# Patient Record
Sex: FEMALE | Race: WHITE | NOT HISPANIC OR LATINO | Employment: OTHER
[De-identification: names, ages, dates, MRNs, and addresses within clinical notes are randomized per-mention and may not be internally consistent; named-entity substitution may affect disease eponyms.]

---

## 2021-01-01 ENCOUNTER — HEALTH MAINTENANCE LETTER (OUTPATIENT)
Age: 66
End: 2021-01-01

## 2021-01-01 ENCOUNTER — HOSPITAL ENCOUNTER (OUTPATIENT)
Dept: MRI IMAGING | Facility: CLINIC | Age: 66
DRG: 025 | End: 2021-08-11
Attending: NEUROLOGICAL SURGERY | Admitting: NEUROLOGICAL SURGERY
Payer: MEDICARE

## 2021-01-01 ENCOUNTER — PATIENT OUTREACH (OUTPATIENT)
Dept: ONCOLOGY | Facility: CLINIC | Age: 66
End: 2021-01-01

## 2021-01-01 ENCOUNTER — ANESTHESIA (OUTPATIENT)
Dept: SURGERY | Facility: CLINIC | Age: 66
DRG: 025 | End: 2021-01-01
Payer: MEDICARE

## 2021-01-01 ENCOUNTER — PRE VISIT (OUTPATIENT)
Dept: SURGERY | Facility: CLINIC | Age: 66
End: 2021-01-01

## 2021-01-01 ENCOUNTER — APPOINTMENT (OUTPATIENT)
Dept: OCCUPATIONAL THERAPY | Facility: CLINIC | Age: 66
DRG: 025 | End: 2021-01-01
Attending: STUDENT IN AN ORGANIZED HEALTH CARE EDUCATION/TRAINING PROGRAM
Payer: MEDICARE

## 2021-01-01 ENCOUNTER — LAB (OUTPATIENT)
Dept: LAB | Facility: CLINIC | Age: 66
End: 2021-01-01
Payer: MEDICARE

## 2021-01-01 ENCOUNTER — APPOINTMENT (OUTPATIENT)
Dept: CT IMAGING | Facility: CLINIC | Age: 66
DRG: 025 | End: 2021-01-01
Attending: STUDENT IN AN ORGANIZED HEALTH CARE EDUCATION/TRAINING PROGRAM
Payer: MEDICARE

## 2021-01-01 ENCOUNTER — ANESTHESIA EVENT (OUTPATIENT)
Dept: SURGERY | Facility: CLINIC | Age: 66
DRG: 025 | End: 2021-01-01
Payer: MEDICARE

## 2021-01-01 ENCOUNTER — VIRTUAL VISIT (OUTPATIENT)
Dept: NEUROSURGERY | Facility: CLINIC | Age: 66
End: 2021-01-01
Attending: NEUROLOGICAL SURGERY
Payer: MEDICARE

## 2021-01-01 ENCOUNTER — PREP FOR PROCEDURE (OUTPATIENT)
Dept: NEUROSURGERY | Facility: CLINIC | Age: 66
End: 2021-01-01

## 2021-01-01 ENCOUNTER — HOSPITAL ENCOUNTER (INPATIENT)
Facility: CLINIC | Age: 66
LOS: 1 days | Discharge: HOME | DRG: 025 | End: 2021-08-12
Attending: NEUROLOGICAL SURGERY | Admitting: NEUROLOGICAL SURGERY
Payer: MEDICARE

## 2021-01-01 ENCOUNTER — DOCUMENTATION ONLY (OUTPATIENT)
Dept: GERIATRICS | Facility: CLINIC | Age: 66
End: 2021-01-01

## 2021-01-01 ENCOUNTER — VIRTUAL VISIT (OUTPATIENT)
Dept: SURGERY | Facility: CLINIC | Age: 66
End: 2021-01-01
Payer: MEDICARE

## 2021-01-01 VITALS
HEART RATE: 102 BPM | HEIGHT: 67 IN | RESPIRATION RATE: 16 BRPM | OXYGEN SATURATION: 96 % | BODY MASS INDEX: 33.22 KG/M2 | DIASTOLIC BLOOD PRESSURE: 68 MMHG | SYSTOLIC BLOOD PRESSURE: 112 MMHG | WEIGHT: 211.64 LBS | TEMPERATURE: 97.6 F

## 2021-01-01 DIAGNOSIS — C79.31 BRAIN METASTASIS: Primary | ICD-10-CM

## 2021-01-01 DIAGNOSIS — C79.31 BRAIN METASTASIS: ICD-10-CM

## 2021-01-01 DIAGNOSIS — Z01.818 PREOP EXAMINATION: Primary | ICD-10-CM

## 2021-01-01 DIAGNOSIS — Z11.59 ENCOUNTER FOR SCREENING FOR OTHER VIRAL DISEASES: ICD-10-CM

## 2021-01-01 DIAGNOSIS — Z01.818 PREOP EXAMINATION: ICD-10-CM

## 2021-01-01 LAB
ABO/RH(D): NORMAL
ABO/RH(D): NORMAL
ALBUMIN SERPL-MCNC: 3.4 G/DL (ref 3.4–5)
ALP SERPL-CCNC: 129 U/L (ref 40–150)
ALT SERPL W P-5'-P-CCNC: 24 U/L (ref 0–50)
ANION GAP SERPL CALCULATED.3IONS-SCNC: 5 MMOL/L (ref 3–14)
ANION GAP SERPL CALCULATED.3IONS-SCNC: 6 MMOL/L (ref 3–14)
ANION GAP SERPL CALCULATED.3IONS-SCNC: 8 MMOL/L (ref 3–14)
ANTIBODY SCREEN: NEGATIVE
ANTIBODY SCREEN: NEGATIVE
APTT PPP: 32 SECONDS (ref 22–38)
AST SERPL W P-5'-P-CCNC: 13 U/L (ref 0–45)
BILIRUB SERPL-MCNC: 0.4 MG/DL (ref 0.2–1.3)
BUN SERPL-MCNC: 11 MG/DL (ref 7–30)
BUN SERPL-MCNC: 8 MG/DL (ref 7–30)
BUN SERPL-MCNC: 9 MG/DL (ref 7–30)
CALCIUM SERPL-MCNC: 8.5 MG/DL (ref 8.5–10.1)
CALCIUM SERPL-MCNC: 8.9 MG/DL (ref 8.5–10.1)
CALCIUM SERPL-MCNC: 9 MG/DL (ref 8.5–10.1)
CHLORIDE BLD-SCNC: 110 MMOL/L (ref 94–109)
CHLORIDE BLD-SCNC: 111 MMOL/L (ref 94–109)
CHLORIDE BLD-SCNC: 114 MMOL/L (ref 94–109)
CO2 SERPL-SCNC: 20 MMOL/L (ref 20–32)
CO2 SERPL-SCNC: 23 MMOL/L (ref 20–32)
CO2 SERPL-SCNC: 25 MMOL/L (ref 20–32)
CREAT SERPL-MCNC: 0.61 MG/DL (ref 0.52–1.04)
CREAT SERPL-MCNC: 0.73 MG/DL (ref 0.52–1.04)
CREAT SERPL-MCNC: 0.79 MG/DL (ref 0.52–1.04)
ERYTHROCYTE [DISTWIDTH] IN BLOOD BY AUTOMATED COUNT: 13.2 % (ref 10–15)
ERYTHROCYTE [DISTWIDTH] IN BLOOD BY AUTOMATED COUNT: 13.4 % (ref 10–15)
ERYTHROCYTE [DISTWIDTH] IN BLOOD BY AUTOMATED COUNT: 13.6 % (ref 10–15)
ERYTHROCYTE [DISTWIDTH] IN BLOOD BY AUTOMATED COUNT: 13.7 % (ref 10–15)
GFR SERPL CREATININE-BSD FRML MDRD: 78 ML/MIN/1.73M2
GFR SERPL CREATININE-BSD FRML MDRD: 86 ML/MIN/1.73M2
GFR SERPL CREATININE-BSD FRML MDRD: >90 ML/MIN/1.73M2
GLUCOSE BLD-MCNC: 102 MG/DL (ref 70–99)
GLUCOSE BLD-MCNC: 115 MG/DL (ref 70–99)
GLUCOSE BLD-MCNC: 166 MG/DL (ref 70–99)
GLUCOSE BLDC GLUCOMTR-MCNC: 125 MG/DL (ref 70–99)
HCT VFR BLD AUTO: 32 % (ref 35–47)
HCT VFR BLD AUTO: 32.1 % (ref 35–47)
HCT VFR BLD AUTO: 36.7 % (ref 35–47)
HCT VFR BLD AUTO: 37.7 % (ref 35–47)
HGB BLD-MCNC: 10.5 G/DL (ref 11.7–15.7)
HGB BLD-MCNC: 10.7 G/DL (ref 11.7–15.7)
HGB BLD-MCNC: 11.9 G/DL (ref 11.7–15.7)
HGB BLD-MCNC: 12.4 G/DL (ref 11.7–15.7)
INR PPP: 0.93 (ref 0.85–1.15)
MAGNESIUM SERPL-MCNC: 2 MG/DL (ref 1.6–2.3)
MAGNESIUM SERPL-MCNC: 2.1 MG/DL (ref 1.6–2.3)
MCH RBC QN AUTO: 33.5 PG (ref 26.5–33)
MCH RBC QN AUTO: 33.7 PG (ref 26.5–33)
MCH RBC QN AUTO: 34.1 PG (ref 26.5–33)
MCH RBC QN AUTO: 34.2 PG (ref 26.5–33)
MCHC RBC AUTO-ENTMCNC: 32.4 G/DL (ref 31.5–36.5)
MCHC RBC AUTO-ENTMCNC: 32.7 G/DL (ref 31.5–36.5)
MCHC RBC AUTO-ENTMCNC: 32.9 G/DL (ref 31.5–36.5)
MCHC RBC AUTO-ENTMCNC: 33.4 G/DL (ref 31.5–36.5)
MCV RBC AUTO: 102 FL (ref 78–100)
MCV RBC AUTO: 103 FL (ref 78–100)
MCV RBC AUTO: 103 FL (ref 78–100)
MCV RBC AUTO: 104 FL (ref 78–100)
PATH REPORT.COMMENTS IMP SPEC: NORMAL
PATH REPORT.FINAL DX SPEC: NORMAL
PATH REPORT.GROSS SPEC: NORMAL
PATH REPORT.INTRAOP OBS SPEC DOC: NORMAL
PATH REPORT.MICROSCOPIC SPEC OTHER STN: NORMAL
PATH REPORT.RELEVANT HX SPEC: NORMAL
PHOSPHATE SERPL-MCNC: 2.3 MG/DL (ref 2.5–4.5)
PHOSPHATE SERPL-MCNC: 3.7 MG/DL (ref 2.5–4.5)
PHOTO IMAGE: NORMAL
PLATELET # BLD AUTO: 179 10E3/UL (ref 150–450)
PLATELET # BLD AUTO: 186 10E3/UL (ref 150–450)
PLATELET # BLD AUTO: 199 10E3/UL (ref 150–450)
PLATELET # BLD AUTO: 222 10E3/UL (ref 150–450)
POTASSIUM BLD-SCNC: 3.4 MMOL/L (ref 3.4–5.3)
POTASSIUM BLD-SCNC: 3.7 MMOL/L (ref 3.4–5.3)
POTASSIUM BLD-SCNC: 3.8 MMOL/L (ref 3.4–5.3)
POTASSIUM BLD-SCNC: 4.2 MMOL/L (ref 3.4–5.3)
PROT SERPL-MCNC: 7.2 G/DL (ref 6.8–8.8)
RBC # BLD AUTO: 3.12 10E6/UL (ref 3.8–5.2)
RBC # BLD AUTO: 3.14 10E6/UL (ref 3.8–5.2)
RBC # BLD AUTO: 3.55 10E6/UL (ref 3.8–5.2)
RBC # BLD AUTO: 3.63 10E6/UL (ref 3.8–5.2)
SARS-COV-2 RNA RESP QL NAA+PROBE: NEGATIVE
SODIUM SERPL-SCNC: 139 MMOL/L (ref 133–144)
SODIUM SERPL-SCNC: 140 MMOL/L (ref 133–144)
SODIUM SERPL-SCNC: 143 MMOL/L (ref 133–144)
SPECIMEN EXPIRATION DATE: NORMAL
SPECIMEN EXPIRATION DATE: NORMAL
WBC # BLD AUTO: 2.5 10E3/UL (ref 4–11)
WBC # BLD AUTO: 3.3 10E3/UL (ref 4–11)
WBC # BLD AUTO: 4.1 10E3/UL (ref 4–11)
WBC # BLD AUTO: 4.6 10E3/UL (ref 4–11)

## 2021-01-01 PROCEDURE — 250N000024 HC ISOFLURANE, PER MIN: Performed by: NEUROLOGICAL SURGERY

## 2021-01-01 PROCEDURE — 258N000003 HC RX IP 258 OP 636: Performed by: ANESTHESIOLOGY

## 2021-01-01 PROCEDURE — 272N000002 HC OR SUPPLY OTHER OPNP: Performed by: NEUROLOGICAL SURGERY

## 2021-01-01 PROCEDURE — 250N000009 HC RX 250: Performed by: NEUROLOGICAL SURGERY

## 2021-01-01 PROCEDURE — 85027 COMPLETE CBC AUTOMATED: CPT | Performed by: STUDENT IN AN ORGANIZED HEALTH CARE EDUCATION/TRAINING PROGRAM

## 2021-01-01 PROCEDURE — 85027 COMPLETE CBC AUTOMATED: CPT

## 2021-01-01 PROCEDURE — 36415 COLL VENOUS BLD VENIPUNCTURE: CPT

## 2021-01-01 PROCEDURE — 250N000011 HC RX IP 250 OP 636: Performed by: NURSE ANESTHETIST, CERTIFIED REGISTERED

## 2021-01-01 PROCEDURE — 86901 BLOOD TYPING SEROLOGIC RH(D): CPT

## 2021-01-01 PROCEDURE — 250N000011 HC RX IP 250 OP 636: Performed by: NEUROLOGICAL SURGERY

## 2021-01-01 PROCEDURE — 250N000009 HC RX 250: Performed by: NURSE ANESTHETIST, CERTIFIED REGISTERED

## 2021-01-01 PROCEDURE — 250N000011 HC RX IP 250 OP 636: Performed by: STUDENT IN AN ORGANIZED HEALTH CARE EDUCATION/TRAINING PROGRAM

## 2021-01-01 PROCEDURE — 250N000013 HC RX MED GY IP 250 OP 250 PS 637: Performed by: NURSE ANESTHETIST, CERTIFIED REGISTERED

## 2021-01-01 PROCEDURE — 272N000001 HC OR GENERAL SUPPLY STERILE: Performed by: NEUROLOGICAL SURGERY

## 2021-01-01 PROCEDURE — 80048 BASIC METABOLIC PNL TOTAL CA: CPT

## 2021-01-01 PROCEDURE — 85610 PROTHROMBIN TIME: CPT | Performed by: STUDENT IN AN ORGANIZED HEALTH CARE EDUCATION/TRAINING PROGRAM

## 2021-01-01 PROCEDURE — 250N000013 HC RX MED GY IP 250 OP 250 PS 637: Performed by: STUDENT IN AN ORGANIZED HEALTH CARE EDUCATION/TRAINING PROGRAM

## 2021-01-01 PROCEDURE — 88331 PATH CONSLTJ SURG 1 BLK 1SPC: CPT | Mod: TC | Performed by: NEUROLOGICAL SURGERY

## 2021-01-01 PROCEDURE — 86900 BLOOD TYPING SEROLOGIC ABO: CPT

## 2021-01-01 PROCEDURE — 84100 ASSAY OF PHOSPHORUS: CPT | Performed by: STUDENT IN AN ORGANIZED HEALTH CARE EDUCATION/TRAINING PROGRAM

## 2021-01-01 PROCEDURE — 85027 COMPLETE CBC AUTOMATED: CPT | Performed by: NEUROLOGICAL SURGERY

## 2021-01-01 PROCEDURE — 999N000141 HC STATISTIC PRE-PROCEDURE NURSING ASSESSMENT: Performed by: NEUROLOGICAL SURGERY

## 2021-01-01 PROCEDURE — 99204 OFFICE O/P NEW MOD 45 MIN: CPT | Mod: 95 | Performed by: PHYSICIAN ASSISTANT

## 2021-01-01 PROCEDURE — 710N000010 HC RECOVERY PHASE 1, LEVEL 2, PER MIN: Performed by: NEUROLOGICAL SURGERY

## 2021-01-01 PROCEDURE — 64999 UNLISTED PX NERVOUS SYSTEM: CPT | Performed by: NEUROLOGICAL SURGERY

## 2021-01-01 PROCEDURE — 70450 CT HEAD/BRAIN W/O DYE: CPT | Mod: 77

## 2021-01-01 PROCEDURE — 70552 MRI BRAIN STEM W/DYE: CPT | Mod: 26 | Performed by: RADIOLOGY

## 2021-01-01 PROCEDURE — 84100 ASSAY OF PHOSPHORUS: CPT | Performed by: NEUROLOGICAL SURGERY

## 2021-01-01 PROCEDURE — 370N000017 HC ANESTHESIA TECHNICAL FEE, PER MIN: Performed by: NEUROLOGICAL SURGERY

## 2021-01-01 PROCEDURE — 82374 ASSAY BLOOD CARBON DIOXIDE: CPT | Performed by: STUDENT IN AN ORGANIZED HEALTH CARE EDUCATION/TRAINING PROGRAM

## 2021-01-01 PROCEDURE — 258N000003 HC RX IP 258 OP 636: Performed by: NURSE ANESTHETIST, CERTIFIED REGISTERED

## 2021-01-01 PROCEDURE — 70450 CT HEAD/BRAIN W/O DYE: CPT | Mod: 26 | Performed by: RADIOLOGY

## 2021-01-01 PROCEDURE — A9585 GADOBUTROL INJECTION: HCPCS | Performed by: NEUROLOGICAL SURGERY

## 2021-01-01 PROCEDURE — 250N000012 HC RX MED GY IP 250 OP 636 PS 637: Performed by: STUDENT IN AN ORGANIZED HEALTH CARE EDUCATION/TRAINING PROGRAM

## 2021-01-01 PROCEDURE — 86900 BLOOD TYPING SEROLOGIC ABO: CPT | Performed by: STUDENT IN AN ORGANIZED HEALTH CARE EDUCATION/TRAINING PROGRAM

## 2021-01-01 PROCEDURE — 999N000157 HC STATISTIC RCP TIME EA 10 MIN

## 2021-01-01 PROCEDURE — U0003 INFECTIOUS AGENT DETECTION BY NUCLEIC ACID (DNA OR RNA); SEVERE ACUTE RESPIRATORY SYNDROME CORONAVIRUS 2 (SARS-COV-2) (CORONAVIRUS DISEASE [COVID-19]), AMPLIFIED PROBE TECHNIQUE, MAKING USE OF HIGH THROUGHPUT TECHNOLOGIES AS DESCRIBED BY CMS-2020-01-R: HCPCS

## 2021-01-01 PROCEDURE — 97165 OT EVAL LOW COMPLEX 30 MIN: CPT | Mod: GO

## 2021-01-01 PROCEDURE — 36415 COLL VENOUS BLD VENIPUNCTURE: CPT | Performed by: STUDENT IN AN ORGANIZED HEALTH CARE EDUCATION/TRAINING PROGRAM

## 2021-01-01 PROCEDURE — 200N000002 HC R&B ICU UMMC

## 2021-01-01 PROCEDURE — 86850 RBC ANTIBODY SCREEN: CPT

## 2021-01-01 PROCEDURE — 999N000015 HC STATISTIC ARTERIAL MONITORING DAILY

## 2021-01-01 PROCEDURE — 999N000147 HC STATISTIC PT IP EVAL DEFER

## 2021-01-01 PROCEDURE — 360N000080 HC SURGERY LEVEL 7, PER MIN: Performed by: NEUROLOGICAL SURGERY

## 2021-01-01 PROCEDURE — U0005 INFEC AGEN DETEC AMPLI PROBE: HCPCS

## 2021-01-01 PROCEDURE — 80053 COMPREHEN METABOLIC PANEL: CPT | Performed by: STUDENT IN AN ORGANIZED HEALTH CARE EDUCATION/TRAINING PROGRAM

## 2021-01-01 PROCEDURE — 88331 PATH CONSLTJ SURG 1 BLK 1SPC: CPT | Mod: 26 | Performed by: SPECIALIST

## 2021-01-01 PROCEDURE — 97535 SELF CARE MNGMENT TRAINING: CPT | Mod: GO

## 2021-01-01 PROCEDURE — 70450 CT HEAD/BRAIN W/O DYE: CPT

## 2021-01-01 PROCEDURE — 00573ZZ DESTRUCTION OF CEREBRAL HEMISPHERE, PERCUTANEOUS APPROACH: ICD-10-PCS | Performed by: NEUROLOGICAL SURGERY

## 2021-01-01 PROCEDURE — 88307 TISSUE EXAM BY PATHOLOGIST: CPT | Mod: 26 | Performed by: SPECIALIST

## 2021-01-01 PROCEDURE — 83735 ASSAY OF MAGNESIUM: CPT | Performed by: STUDENT IN AN ORGANIZED HEALTH CARE EDUCATION/TRAINING PROGRAM

## 2021-01-01 PROCEDURE — 99204 OFFICE O/P NEW MOD 45 MIN: CPT | Mod: 95 | Performed by: NEUROLOGICAL SURGERY

## 2021-01-01 PROCEDURE — 258N000003 HC RX IP 258 OP 636: Performed by: STUDENT IN AN ORGANIZED HEALTH CARE EDUCATION/TRAINING PROGRAM

## 2021-01-01 PROCEDURE — 84132 ASSAY OF SERUM POTASSIUM: CPT | Performed by: STUDENT IN AN ORGANIZED HEALTH CARE EDUCATION/TRAINING PROGRAM

## 2021-01-01 PROCEDURE — 258N000003 HC RX IP 258 OP 636: Performed by: NEUROLOGICAL SURGERY

## 2021-01-01 PROCEDURE — 250N000009 HC RX 250: Performed by: ANESTHESIOLOGY

## 2021-01-01 PROCEDURE — 255N000002 HC RX 255 OP 636: Performed by: NEUROLOGICAL SURGERY

## 2021-01-01 PROCEDURE — 85730 THROMBOPLASTIN TIME PARTIAL: CPT | Performed by: STUDENT IN AN ORGANIZED HEALTH CARE EDUCATION/TRAINING PROGRAM

## 2021-01-01 PROCEDURE — 83735 ASSAY OF MAGNESIUM: CPT | Performed by: NEUROLOGICAL SURGERY

## 2021-01-01 PROCEDURE — G1004 CDSM NDSC: HCPCS | Performed by: RADIOLOGY

## 2021-01-01 PROCEDURE — G1004 CDSM NDSC: HCPCS

## 2021-01-01 PROCEDURE — 00B73ZX EXCISION OF CEREBRAL HEMISPHERE, PERCUTANEOUS APPROACH, DIAGNOSTIC: ICD-10-PCS | Performed by: NEUROLOGICAL SURGERY

## 2021-01-01 RX ORDER — LEVETIRACETAM 100 MG/ML
SOLUTION ORAL PRN
Status: DISCONTINUED | OUTPATIENT
Start: 2021-01-01 | End: 2021-01-01

## 2021-01-01 RX ORDER — OXYCODONE HYDROCHLORIDE 5 MG/1
5 TABLET ORAL EVERY 4 HOURS PRN
Status: DISCONTINUED | OUTPATIENT
Start: 2021-01-01 | End: 2021-01-01 | Stop reason: ALTCHOICE

## 2021-01-01 RX ORDER — TOPIRAMATE 25 MG/1
50 TABLET, FILM COATED ORAL 2 TIMES DAILY
Status: DISCONTINUED | OUTPATIENT
Start: 2021-01-01 | End: 2021-01-01 | Stop reason: HOSPADM

## 2021-01-01 RX ORDER — LABETALOL HYDROCHLORIDE 5 MG/ML
10 INJECTION, SOLUTION INTRAVENOUS
Status: DISCONTINUED | OUTPATIENT
Start: 2021-01-01 | End: 2021-01-01 | Stop reason: HOSPADM

## 2021-01-01 RX ORDER — PROCHLORPERAZINE MALEATE 5 MG
5 TABLET ORAL EVERY 6 HOURS PRN
Status: DISCONTINUED | OUTPATIENT
Start: 2021-01-01 | End: 2021-01-01 | Stop reason: HOSPADM

## 2021-01-01 RX ORDER — LIDOCAINE 40 MG/G
CREAM TOPICAL
Status: DISCONTINUED | OUTPATIENT
Start: 2021-01-01 | End: 2021-01-01 | Stop reason: HOSPADM

## 2021-01-01 RX ORDER — AMOXICILLIN 250 MG
1 CAPSULE ORAL 2 TIMES DAILY
Status: DISCONTINUED | OUTPATIENT
Start: 2021-01-01 | End: 2021-01-01 | Stop reason: HOSPADM

## 2021-01-01 RX ORDER — TRAZODONE HYDROCHLORIDE 50 MG/1
125 TABLET, FILM COATED ORAL AT BEDTIME
COMMUNITY
Start: 2020-07-08 | End: 2022-01-01

## 2021-01-01 RX ORDER — ONDANSETRON 2 MG/ML
4 INJECTION INTRAMUSCULAR; INTRAVENOUS EVERY 6 HOURS PRN
Status: DISCONTINUED | OUTPATIENT
Start: 2021-01-01 | End: 2021-01-01 | Stop reason: HOSPADM

## 2021-01-01 RX ORDER — PROCHLORPERAZINE MALEATE 10 MG
10 TABLET ORAL EVERY 6 HOURS PRN
COMMUNITY
Start: 2021-02-01

## 2021-01-01 RX ORDER — HEPARIN SODIUM,PORCINE 10 UNIT/ML
5-10 VIAL (ML) INTRAVENOUS
Status: DISCONTINUED | OUTPATIENT
Start: 2021-01-01 | End: 2021-01-01 | Stop reason: HOSPADM

## 2021-01-01 RX ORDER — DEXAMETHASONE 1 MG
1 TABLET ORAL EVERY 8 HOURS
Qty: 9 TABLET | Refills: 0 | Status: SHIPPED | OUTPATIENT
Start: 2021-01-01 | End: 2021-01-01

## 2021-01-01 RX ORDER — LEVETIRACETAM 750 MG/1
750 TABLET ORAL 2 TIMES DAILY
Status: DISCONTINUED | OUTPATIENT
Start: 2021-01-01 | End: 2021-01-01 | Stop reason: HOSPADM

## 2021-01-01 RX ORDER — TOPIRAMATE 50 MG/1
TABLET, FILM COATED ORAL 2 TIMES DAILY
COMMUNITY
Start: 2020-09-04

## 2021-01-01 RX ORDER — NALOXONE HYDROCHLORIDE 0.4 MG/ML
0.4 INJECTION, SOLUTION INTRAMUSCULAR; INTRAVENOUS; SUBCUTANEOUS
Status: DISCONTINUED | OUTPATIENT
Start: 2021-01-01 | End: 2021-01-01 | Stop reason: HOSPADM

## 2021-01-01 RX ORDER — DEXAMETHASONE 4 MG/1
4 TABLET ORAL EVERY 8 HOURS
Qty: 12 TABLET | Refills: 0 | Status: SHIPPED | OUTPATIENT
Start: 2021-01-01 | End: 2021-01-01

## 2021-01-01 RX ORDER — POLYETHYLENE GLYCOL 3350 17 G/17G
17 POWDER, FOR SOLUTION ORAL DAILY
Status: DISCONTINUED | OUTPATIENT
Start: 2021-01-01 | End: 2021-01-01 | Stop reason: HOSPADM

## 2021-01-01 RX ORDER — ACETAMINOPHEN 325 MG/1
650 TABLET ORAL EVERY 4 HOURS PRN
Status: DISCONTINUED | OUTPATIENT
Start: 2021-01-01 | End: 2021-01-01 | Stop reason: HOSPADM

## 2021-01-01 RX ORDER — DEXAMETHASONE 4 MG/1
4 TABLET ORAL EVERY 8 HOURS SCHEDULED
Status: DISCONTINUED | OUTPATIENT
Start: 2021-01-01 | End: 2021-01-01 | Stop reason: HOSPADM

## 2021-01-01 RX ORDER — BIOTIN 10 MG
TABLET ORAL EVERY MORNING
COMMUNITY

## 2021-01-01 RX ORDER — NYSTATIN 100000 [USP'U]/G
POWDER TOPICAL DAILY PRN
COMMUNITY

## 2021-01-01 RX ORDER — LIDOCAINE 40 MG/G
CREAM TOPICAL
Status: COMPLETED | OUTPATIENT
Start: 2021-01-01 | End: 2021-01-01

## 2021-01-01 RX ORDER — BENZONATATE 200 MG/1
CAPSULE ORAL 3 TIMES DAILY
COMMUNITY
Start: 2021-03-10

## 2021-01-01 RX ORDER — OXYCODONE HYDROCHLORIDE 10 MG/1
10 TABLET ORAL EVERY 4 HOURS PRN
Status: DISCONTINUED | OUTPATIENT
Start: 2021-01-01 | End: 2021-01-01 | Stop reason: HOSPADM

## 2021-01-01 RX ORDER — LANOLIN ALCOHOL/MO/W.PET/CERES
1 CREAM (GRAM) TOPICAL
COMMUNITY

## 2021-01-01 RX ORDER — SODIUM CHLORIDE, SODIUM LACTATE, POTASSIUM CHLORIDE, CALCIUM CHLORIDE 600; 310; 30; 20 MG/100ML; MG/100ML; MG/100ML; MG/100ML
INJECTION, SOLUTION INTRAVENOUS CONTINUOUS
Status: DISCONTINUED | OUTPATIENT
Start: 2021-01-01 | End: 2021-01-01 | Stop reason: HOSPADM

## 2021-01-01 RX ORDER — GADOBUTROL 604.72 MG/ML
7.5 INJECTION INTRAVENOUS ONCE
Status: COMPLETED | OUTPATIENT
Start: 2021-01-01 | End: 2021-01-01

## 2021-01-01 RX ORDER — HYDROMORPHONE HCL IN WATER/PF 6 MG/30 ML
0.2 PATIENT CONTROLLED ANALGESIA SYRINGE INTRAVENOUS EVERY 5 MIN PRN
Status: DISCONTINUED | OUTPATIENT
Start: 2021-01-01 | End: 2021-01-01 | Stop reason: HOSPADM

## 2021-01-01 RX ORDER — SODIUM CHLORIDE 9 MG/ML
INJECTION, SOLUTION INTRAVENOUS CONTINUOUS
Status: ACTIVE | OUTPATIENT
Start: 2021-01-01 | End: 2021-01-01

## 2021-01-01 RX ORDER — LEVETIRACETAM 500 MG/1
500 TABLET ORAL 2 TIMES DAILY
Qty: 14 TABLET | Refills: 0 | Status: SHIPPED | OUTPATIENT
Start: 2021-01-01 | End: 2021-01-01

## 2021-01-01 RX ORDER — CALCIUM CARBONATE 500 MG/1
1 TABLET, CHEWABLE ORAL 2 TIMES DAILY
COMMUNITY

## 2021-01-01 RX ORDER — DEXAMETHASONE 2 MG/1
2 TABLET ORAL EVERY 8 HOURS
Qty: 9 TABLET | Refills: 0 | Status: SHIPPED | OUTPATIENT
Start: 2021-01-01 | End: 2021-01-01

## 2021-01-01 RX ORDER — LABETALOL HYDROCHLORIDE 5 MG/ML
10-40 INJECTION, SOLUTION INTRAVENOUS EVERY 10 MIN PRN
Status: DISCONTINUED | OUTPATIENT
Start: 2021-01-01 | End: 2021-01-01 | Stop reason: HOSPADM

## 2021-01-01 RX ORDER — LIDOCAINE HYDROCHLORIDE 20 MG/ML
SOLUTION OROPHARYNGEAL
COMMUNITY
Start: 2020-12-11

## 2021-01-01 RX ORDER — HEPARIN SODIUM,PORCINE 10 UNIT/ML
5-10 VIAL (ML) INTRAVENOUS EVERY 24 HOURS
Status: DISCONTINUED | OUTPATIENT
Start: 2021-01-01 | End: 2021-01-01 | Stop reason: HOSPADM

## 2021-01-01 RX ORDER — ACETAMINOPHEN 325 MG/1
975 TABLET ORAL EVERY 8 HOURS
Status: DISCONTINUED | OUTPATIENT
Start: 2021-01-01 | End: 2021-01-01 | Stop reason: HOSPADM

## 2021-01-01 RX ORDER — SODIUM CHLORIDE, SODIUM LACTATE, POTASSIUM CHLORIDE, CALCIUM CHLORIDE 600; 310; 30; 20 MG/100ML; MG/100ML; MG/100ML; MG/100ML
INJECTION, SOLUTION INTRAVENOUS CONTINUOUS PRN
Status: DISCONTINUED | OUTPATIENT
Start: 2021-01-01 | End: 2021-01-01

## 2021-01-01 RX ORDER — ONDANSETRON 2 MG/ML
INJECTION INTRAMUSCULAR; INTRAVENOUS PRN
Status: DISCONTINUED | OUTPATIENT
Start: 2021-01-01 | End: 2021-01-01

## 2021-01-01 RX ORDER — CLINDAMYCIN PHOSPHATE 900 MG/50ML
900 INJECTION, SOLUTION INTRAVENOUS SEE ADMIN INSTRUCTIONS
Status: DISCONTINUED | OUTPATIENT
Start: 2021-01-01 | End: 2021-01-01 | Stop reason: HOSPADM

## 2021-01-01 RX ORDER — DEXAMETHASONE 4 MG/1
TABLET ORAL
Status: ON HOLD | COMMUNITY
Start: 2021-03-18 | End: 2021-01-01

## 2021-01-01 RX ORDER — SIMVASTATIN 20 MG
20 TABLET ORAL AT BEDTIME
Status: DISCONTINUED | OUTPATIENT
Start: 2021-01-01 | End: 2021-01-01 | Stop reason: HOSPADM

## 2021-01-01 RX ORDER — OXYCODONE HYDROCHLORIDE 5 MG/1
5 TABLET ORAL EVERY 4 HOURS PRN
Status: DISCONTINUED | OUTPATIENT
Start: 2021-01-01 | End: 2021-01-01 | Stop reason: HOSPADM

## 2021-01-01 RX ORDER — CLINDAMYCIN PHOSPHATE 900 MG/50ML
900 INJECTION, SOLUTION INTRAVENOUS EVERY 8 HOURS
Status: DISCONTINUED | OUTPATIENT
Start: 2021-01-01 | End: 2021-01-01 | Stop reason: HOSPADM

## 2021-01-01 RX ORDER — OXYCODONE HYDROCHLORIDE 5 MG/1
5 TABLET ORAL EVERY 4 HOURS PRN
Qty: 20 TABLET | Refills: 0 | Status: SHIPPED | OUTPATIENT
Start: 2021-01-01

## 2021-01-01 RX ORDER — BENZONATATE 100 MG/1
100 CAPSULE ORAL 3 TIMES DAILY PRN
Status: DISCONTINUED | OUTPATIENT
Start: 2021-01-01 | End: 2021-01-01 | Stop reason: HOSPADM

## 2021-01-01 RX ORDER — NALOXONE HYDROCHLORIDE 0.4 MG/ML
0.2 INJECTION, SOLUTION INTRAMUSCULAR; INTRAVENOUS; SUBCUTANEOUS
Status: DISCONTINUED | OUTPATIENT
Start: 2021-01-01 | End: 2021-01-01 | Stop reason: HOSPADM

## 2021-01-01 RX ORDER — DEXAMETHASONE SODIUM PHOSPHATE 4 MG/ML
INJECTION, SOLUTION INTRA-ARTICULAR; INTRALESIONAL; INTRAMUSCULAR; INTRAVENOUS; SOFT TISSUE PRN
Status: DISCONTINUED | OUTPATIENT
Start: 2021-01-01 | End: 2021-01-01

## 2021-01-01 RX ORDER — DEXAMETHASONE 1 MG
3 TABLET ORAL EVERY 8 HOURS SCHEDULED
Status: DISCONTINUED | OUTPATIENT
Start: 2021-01-01 | End: 2021-01-01 | Stop reason: HOSPADM

## 2021-01-01 RX ORDER — CLINDAMYCIN PHOSPHATE 900 MG/50ML
900 INJECTION, SOLUTION INTRAVENOUS
Status: COMPLETED | OUTPATIENT
Start: 2021-01-01 | End: 2021-01-01

## 2021-01-01 RX ORDER — HYDRALAZINE HYDROCHLORIDE 20 MG/ML
2.5-5 INJECTION INTRAMUSCULAR; INTRAVENOUS EVERY 10 MIN PRN
Status: DISCONTINUED | OUTPATIENT
Start: 2021-01-01 | End: 2021-01-01 | Stop reason: HOSPADM

## 2021-01-01 RX ORDER — DEXAMETHASONE 1.5 MG/1
3 TABLET ORAL EVERY 8 HOURS
Qty: 24 TABLET | Refills: 0 | Status: SHIPPED | OUTPATIENT
Start: 2021-01-01 | End: 2021-01-01

## 2021-01-01 RX ORDER — CLINDAMYCIN HCL 300 MG
300 CAPSULE ORAL 3 TIMES DAILY
Qty: 21 CAPSULE | Refills: 0 | Status: SHIPPED | OUTPATIENT
Start: 2021-01-01 | End: 2021-01-01

## 2021-01-01 RX ORDER — DEXAMETHASONE 1 MG
2 TABLET ORAL EVERY 8 HOURS SCHEDULED
Status: DISCONTINUED | OUTPATIENT
Start: 2021-01-01 | End: 2021-01-01 | Stop reason: HOSPADM

## 2021-01-01 RX ORDER — DEXAMETHASONE 1 MG
1 TABLET ORAL EVERY 8 HOURS SCHEDULED
Status: DISCONTINUED | OUTPATIENT
Start: 2021-01-01 | End: 2021-01-01 | Stop reason: HOSPADM

## 2021-01-01 RX ORDER — HEPARIN SODIUM (PORCINE) LOCK FLUSH IV SOLN 100 UNIT/ML 100 UNIT/ML
5-10 SOLUTION INTRAVENOUS
Status: DISCONTINUED | OUTPATIENT
Start: 2021-01-01 | End: 2021-01-01 | Stop reason: HOSPADM

## 2021-01-01 RX ORDER — LABETALOL 20 MG/4 ML (5 MG/ML) INTRAVENOUS SYRINGE
PRN
Status: DISCONTINUED | OUTPATIENT
Start: 2021-01-01 | End: 2021-01-01

## 2021-01-01 RX ORDER — LIDOCAINE HYDROCHLORIDE 20 MG/ML
INJECTION, SOLUTION INFILTRATION; PERINEURAL PRN
Status: DISCONTINUED | OUTPATIENT
Start: 2021-01-01 | End: 2021-01-01

## 2021-01-01 RX ORDER — HYDRALAZINE HYDROCHLORIDE 20 MG/ML
10-20 INJECTION INTRAMUSCULAR; INTRAVENOUS EVERY 30 MIN PRN
Status: DISCONTINUED | OUTPATIENT
Start: 2021-01-01 | End: 2021-01-01 | Stop reason: HOSPADM

## 2021-01-01 RX ORDER — ONDANSETRON 4 MG/1
4 TABLET, ORALLY DISINTEGRATING ORAL EVERY 30 MIN PRN
Status: DISCONTINUED | OUTPATIENT
Start: 2021-01-01 | End: 2021-01-01 | Stop reason: HOSPADM

## 2021-01-01 RX ORDER — FOLIC ACID 0.8 MG
800 TABLET ORAL EVERY MORNING
COMMUNITY

## 2021-01-01 RX ORDER — AMOXICILLIN 250 MG
1 CAPSULE ORAL 2 TIMES DAILY
Qty: 28 TABLET | Refills: 0 | Status: SHIPPED | OUTPATIENT
Start: 2021-01-01 | End: 2021-01-01

## 2021-01-01 RX ORDER — MAGNESIUM HYDROXIDE/ALUMINUM HYDROXICE/SIMETHICONE 120; 1200; 1200 MG/30ML; MG/30ML; MG/30ML
30 SUSPENSION ORAL
COMMUNITY
Start: 2020-09-24

## 2021-01-01 RX ORDER — PROPOFOL 10 MG/ML
INJECTION, EMULSION INTRAVENOUS PRN
Status: DISCONTINUED | OUTPATIENT
Start: 2021-01-01 | End: 2021-01-01

## 2021-01-01 RX ORDER — FENTANYL CITRATE 50 UG/ML
25 INJECTION, SOLUTION INTRAMUSCULAR; INTRAVENOUS EVERY 5 MIN PRN
Status: DISCONTINUED | OUTPATIENT
Start: 2021-01-01 | End: 2021-01-01 | Stop reason: HOSPADM

## 2021-01-01 RX ORDER — BISACODYL 10 MG
10 SUPPOSITORY, RECTAL RECTAL DAILY PRN
Status: DISCONTINUED | OUTPATIENT
Start: 2021-01-01 | End: 2021-01-01 | Stop reason: HOSPADM

## 2021-01-01 RX ORDER — HYDROMORPHONE HYDROCHLORIDE 1 MG/ML
0.4 INJECTION, SOLUTION INTRAMUSCULAR; INTRAVENOUS; SUBCUTANEOUS
Status: DISCONTINUED | OUTPATIENT
Start: 2021-01-01 | End: 2021-01-01 | Stop reason: HOSPADM

## 2021-01-01 RX ORDER — FENTANYL CITRATE 50 UG/ML
INJECTION, SOLUTION INTRAMUSCULAR; INTRAVENOUS PRN
Status: DISCONTINUED | OUTPATIENT
Start: 2021-01-01 | End: 2021-01-01

## 2021-01-01 RX ORDER — FLUTICASONE PROPIONATE 50 MCG
1 SPRAY, SUSPENSION (ML) NASAL DAILY PRN
COMMUNITY

## 2021-01-01 RX ORDER — SIMVASTATIN 20 MG
20 TABLET ORAL AT BEDTIME
COMMUNITY
Start: 2020-09-04 | End: 2022-01-01

## 2021-01-01 RX ORDER — ONDANSETRON 4 MG/1
4 TABLET, ORALLY DISINTEGRATING ORAL EVERY 6 HOURS PRN
Status: DISCONTINUED | OUTPATIENT
Start: 2021-01-01 | End: 2021-01-01 | Stop reason: HOSPADM

## 2021-01-01 RX ORDER — ONDANSETRON 2 MG/ML
4 INJECTION INTRAMUSCULAR; INTRAVENOUS EVERY 30 MIN PRN
Status: DISCONTINUED | OUTPATIENT
Start: 2021-01-01 | End: 2021-01-01 | Stop reason: HOSPADM

## 2021-01-01 RX ORDER — SENNOSIDES 8.6 MG
1 TABLET ORAL DAILY
COMMUNITY

## 2021-01-01 RX ORDER — HYDROMORPHONE HYDROCHLORIDE 1 MG/ML
0.2 INJECTION, SOLUTION INTRAMUSCULAR; INTRAVENOUS; SUBCUTANEOUS
Status: DISCONTINUED | OUTPATIENT
Start: 2021-01-01 | End: 2021-01-01 | Stop reason: HOSPADM

## 2021-01-01 RX ADMIN — LIDOCAINE: 40 CREAM TOPICAL at 11:43

## 2021-01-01 RX ADMIN — Medication 5 ML: at 11:31

## 2021-01-01 RX ADMIN — MIDAZOLAM 1 MG: 1 INJECTION INTRAMUSCULAR; INTRAVENOUS at 15:24

## 2021-01-01 RX ADMIN — CLINDAMYCIN IN 5 PERCENT DEXTROSE 900 MG: 18 INJECTION, SOLUTION INTRAVENOUS at 06:17

## 2021-01-01 RX ADMIN — ROCURONIUM BROMIDE 60 MG: 10 INJECTION INTRAVENOUS at 15:30

## 2021-01-01 RX ADMIN — CLINDAMYCIN PHOSPHATE 900 MG: 900 INJECTION, SOLUTION INTRAVENOUS at 15:40

## 2021-01-01 RX ADMIN — FENTANYL CITRATE 50 MCG: 50 INJECTION, SOLUTION INTRAMUSCULAR; INTRAVENOUS at 17:46

## 2021-01-01 RX ADMIN — LABETALOL HYDROCHLORIDE 10 MG: 5 INJECTION, SOLUTION INTRAVENOUS at 21:11

## 2021-01-01 RX ADMIN — ROCURONIUM BROMIDE 20 MG: 10 INJECTION INTRAVENOUS at 17:46

## 2021-01-01 RX ADMIN — LEVETIRACETAM 1000 MG: 100 SOLUTION ORAL at 15:55

## 2021-01-01 RX ADMIN — LABETALOL 20 MG/4 ML (5 MG/ML) INTRAVENOUS SYRINGE 10 MG: at 18:31

## 2021-01-01 RX ADMIN — PROPOFOL 200 MG: 10 INJECTION, EMULSION INTRAVENOUS at 15:30

## 2021-01-01 RX ADMIN — PHENYLEPHRINE HYDROCHLORIDE 200 MCG: 10 INJECTION INTRAVENOUS at 15:35

## 2021-01-01 RX ADMIN — SODIUM CHLORIDE, POTASSIUM CHLORIDE, SODIUM LACTATE AND CALCIUM CHLORIDE: 600; 310; 30; 20 INJECTION, SOLUTION INTRAVENOUS at 15:14

## 2021-01-01 RX ADMIN — ACETAMINOPHEN 975 MG: 325 TABLET, FILM COATED ORAL at 04:03

## 2021-01-01 RX ADMIN — DEXAMETHASONE SODIUM PHOSPHATE 2 MG: 4 INJECTION, SOLUTION INTRA-ARTICULAR; INTRALESIONAL; INTRAMUSCULAR; INTRAVENOUS; SOFT TISSUE at 17:21

## 2021-01-01 RX ADMIN — PHENYLEPHRINE HYDROCHLORIDE 100 MCG: 10 INJECTION INTRAVENOUS at 15:30

## 2021-01-01 RX ADMIN — FENTANYL CITRATE 50 MCG: 50 INJECTION, SOLUTION INTRAMUSCULAR; INTRAVENOUS at 15:30

## 2021-01-01 RX ADMIN — LEVETIRACETAM 750 MG: 750 TABLET, FILM COATED ORAL at 08:23

## 2021-01-01 RX ADMIN — DEXAMETHASONE 4 MG: 4 TABLET ORAL at 23:39

## 2021-01-01 RX ADMIN — POTASSIUM PHOSPHATE, MONOBASIC AND POTASSIUM PHOSPHATE, DIBASIC 15 MMOL: 224; 236 INJECTION, SOLUTION INTRAVENOUS at 08:09

## 2021-01-01 RX ADMIN — PROPOFOL 30 MG: 10 INJECTION, EMULSION INTRAVENOUS at 17:34

## 2021-01-01 RX ADMIN — PROPOFOL 70 MG: 10 INJECTION, EMULSION INTRAVENOUS at 17:21

## 2021-01-01 RX ADMIN — CLINDAMYCIN IN 5 PERCENT DEXTROSE 900 MG: 18 INJECTION, SOLUTION INTRAVENOUS at 22:16

## 2021-01-01 RX ADMIN — LIDOCAINE HYDROCHLORIDE 100 MG: 20 INJECTION, SOLUTION INFILTRATION; PERINEURAL at 15:30

## 2021-01-01 RX ADMIN — ONDANSETRON 4 MG: 2 INJECTION INTRAMUSCULAR; INTRAVENOUS at 18:25

## 2021-01-01 RX ADMIN — SODIUM CHLORIDE, POTASSIUM CHLORIDE, SODIUM LACTATE AND CALCIUM CHLORIDE: 600; 310; 30; 20 INJECTION, SOLUTION INTRAVENOUS at 19:15

## 2021-01-01 RX ADMIN — TOPIRAMATE 50 MG: 25 TABLET ORAL at 08:22

## 2021-01-01 RX ADMIN — SUGAMMADEX 200 MG: 100 INJECTION, SOLUTION INTRAVENOUS at 18:57

## 2021-01-01 RX ADMIN — ACETAMINOPHEN 975 MG: 325 TABLET, FILM COATED ORAL at 22:04

## 2021-01-01 RX ADMIN — DOCUSATE SODIUM 50 MG AND SENNOSIDES 8.6 MG 1 TABLET: 8.6; 5 TABLET, FILM COATED ORAL at 08:23

## 2021-01-01 RX ADMIN — DEXAMETHASONE SODIUM PHOSPHATE 8 MG: 4 INJECTION, SOLUTION INTRA-ARTICULAR; INTRALESIONAL; INTRAMUSCULAR; INTRAVENOUS; SOFT TISSUE at 16:06

## 2021-01-01 RX ADMIN — FENTANYL CITRATE 50 MCG: 50 INJECTION, SOLUTION INTRAMUSCULAR; INTRAVENOUS at 15:24

## 2021-01-01 RX ADMIN — MIDAZOLAM 1 MG: 1 INJECTION INTRAMUSCULAR; INTRAVENOUS at 15:30

## 2021-01-01 RX ADMIN — HYDROMORPHONE HYDROCHLORIDE 0.5 MG: 1 INJECTION, SOLUTION INTRAMUSCULAR; INTRAVENOUS; SUBCUTANEOUS at 18:31

## 2021-01-01 RX ADMIN — LEVETIRACETAM 750 MG: 750 TABLET, FILM COATED ORAL at 23:39

## 2021-01-01 RX ADMIN — DEXAMETHASONE 4 MG: 4 TABLET ORAL at 06:17

## 2021-01-01 RX ADMIN — PROPOFOL 50 MG: 10 INJECTION, EMULSION INTRAVENOUS at 15:48

## 2021-01-01 RX ADMIN — ROCURONIUM BROMIDE 30 MG: 10 INJECTION INTRAVENOUS at 17:19

## 2021-01-01 RX ADMIN — SODIUM CHLORIDE, POTASSIUM CHLORIDE, SODIUM LACTATE AND CALCIUM CHLORIDE: 600; 310; 30; 20 INJECTION, SOLUTION INTRAVENOUS at 18:55

## 2021-01-01 RX ADMIN — FENTANYL CITRATE 100 MCG: 50 INJECTION, SOLUTION INTRAMUSCULAR; INTRAVENOUS at 16:43

## 2021-01-01 RX ADMIN — GADOBUTROL 10 ML: 604.72 INJECTION INTRAVENOUS at 18:44

## 2021-01-01 RX ADMIN — ROCURONIUM BROMIDE 40 MG: 10 INJECTION INTRAVENOUS at 16:22

## 2021-01-01 RX ADMIN — SODIUM CHLORIDE: 9 INJECTION, SOLUTION INTRAVENOUS at 20:20

## 2021-01-01 ASSESSMENT — VISUAL ACUITY
OU: NORMAL ACUITY
OU: OTHER (SEE COMMENT)
OU: OTHER (SEE COMMENT)
OU: NORMAL ACUITY
OU: OTHER (SEE COMMENT)
OU: NORMAL ACUITY
OU: NORMAL ACUITY

## 2021-01-01 ASSESSMENT — ACTIVITIES OF DAILY LIVING (ADL)
ADLS_ACUITY_SCORE: 18
ADLS_ACUITY_SCORE: 18

## 2021-01-01 ASSESSMENT — PAIN SCALES - GENERAL: PAINLEVEL: NO PAIN (0)

## 2021-01-01 ASSESSMENT — LIFESTYLE VARIABLES: TOBACCO_USE: 0

## 2021-01-01 ASSESSMENT — MIFFLIN-ST. JEOR: SCORE: 1532.63

## 2021-05-24 ENCOUNTER — TRANSCRIBE ORDERS (OUTPATIENT)
Dept: OTHER | Age: 66
End: 2021-05-24

## 2021-05-24 DIAGNOSIS — C34.32 SMALL CELL CARCINOMA OF LOWER LOBE OF LEFT LUNG (H): Primary | ICD-10-CM

## 2021-05-24 DIAGNOSIS — C79.31 METASTATIC SMALL CELL CARCINOMA TO BRAIN (H): ICD-10-CM

## 2021-05-25 NOTE — TELEPHONE ENCOUNTER
RECORDS RECEIVED FROM: External   Date of Appt: 5/28/21   NOTES (FOR ALL VISITS) STATUS DETAILS   OFFICE NOTE from referring provider Care Everywhere Dr Randa Rai @ [facility]:  5/24/21 4/12/21  (additional encounters in Care Everywhere)   OFFICE NOTE from other specialist N/A    DISCHARGE SUMMARY from hospital Care Everywhere [facility]:  8/31/20-9/4/20   DISCHARGE REPORT from the ER Care Everywhere [facility]:  8/31/20   OPERATIVE REPORT N/A    MEDICATION LIST Care Everywhere    IMAGING  (FOR ALL VISITS)     EMG N/A    EEG N/A    LUMBAR PUNCTURE N/A    MARCELINO SCAN N/A    ULTRASOUND (CAROTID BILAT) *VASCULAR* N/A    MRI (HEAD, NECK, SPINE) Received [facility]:  MRI Brain 5/20/21  MRI Brain 4/9/21  MRI Brain 1/8/21  MRI Brain 10/7/20  MRI Brain 9/18/20  MRI Brain 9/17/20    [facility]:  MRI Head 8/31/20   CT (HEAD, NECK, SPINE) Received [facility]:  CT Head 9/16/20    [facility]:  CT Head 8/31/20      Action 5/25/21 MV 2.33pm   Action Taken Imaging request faxed to [facility] for:  MRI Brain 5/20/21  MRI Brain 4/9/21  MRI Brain 1/8/21  MRI Brain 10/7/20  MRI Brain 9/18/20  MRI Brain 9/17/20  CT Head 9/16/20    Imaging request faxed to [facility] for:  MRI Head 8/31/20  CT Head 8/31/20     Action 5/26/21 MV 6.47am   Action Taken Images received from [facility] and resolved in PACS     Action 5/27/21 MV 7.08am   Action Taken Images received from [facility] and resolved in PACS

## 2021-05-28 ENCOUNTER — PRE VISIT (OUTPATIENT)
Dept: NEUROSURGERY | Facility: CLINIC | Age: 66
End: 2021-05-28

## 2021-07-30 NOTE — PROGRESS NOTES
"Tamiko is a 65 year old who is being evaluated via a billable video visit.      How would you like to obtain your AVS? Mail a copy  If the video visit is dropped, the invitation should be resent by: Other e-mail: [email or web address]  Will anyone else be joining your video visit? No     Vitals - Patient Reported  Weight (Patient Reported): 94.8 kg (209 lb)  Height (Patient Reported): 170.2 cm (5' 7\")  BMI (Based on Pt Reported Ht/Wt): 32.73  Pain Score: No Pain (0)    Charlene SUAREZ        Video Start Time: 3:00 PM  Video-Visit Details    Type of service:  Video Visit    Video End Time:3:30 PM    Originating Location (pt. Location): Home    Distant Location (provider location):  [facility]     Platform used for Video Visit: [facility]     Neurosurgery Clinic Note    65 F with stage IV small cell lung CA s/p SRS to a right temporal/occipital BM. Serial MRI's since SRS showed progressive enlargement of the lesion, now measuring ~1.5 cm CE+ with surrounding FLAIR. The patient presents for consideration of tissue diagnosis    On review of systems, the patient notes intermittent visual hallucination (\"seeing things that are not there\")    PMH:  Thrombocytopenia  Small cell lung CA      Current Outpatient Medications:      alum & mag hydroxide-simethicone (MAALOX) 200-200-20 MG/5ML SUSP suspension, Take 30 mLs by mouth, Disp: , Rfl:      benzonatate (TESSALON) 200 MG capsule, TAKE 1 CAPSULE BY MOUTH THREE TIMES A DAY AS NEEDED (FOR CHEMORADIATION INDUCED COUGH)., Disp: , Rfl:      Biotin 10 MG TABS tablet, , Disp: , Rfl:      cholecalciferol 50 MCG (2000 UT) tablet, Take 2,000 Units by mouth, Disp: , Rfl:      dexamethasone (DECADRON) 4 MG tablet, Take 1 tab (4 mg) orally twice daily for three days. Start the second day of chemotherapy., Disp: , Rfl:      FLUoxetine (PROZAC) 20 MG capsule, Take 20 mg by mouth, Disp: , Rfl:      fluticasone (FLONASE) 50 MCG/ACT nasal spray, 1 spray, Disp: , " Rfl:      folic acid 800 MCG tablet, Take 800 mcg by mouth, Disp: , Rfl:      lidocaine (XYLOCAINE) 2 % solution, , Disp: , Rfl:      nystatin (MYCOSTATIN) 323384 UNIT/GM external powder, , Disp: , Rfl:      omeprazole (PRILOSEC) 20 MG DR capsule, Take 20 mg by mouth, Disp: , Rfl:      prochlorperazine (COMPAZINE) 10 MG tablet, Take 10 mg by mouth, Disp: , Rfl:      simvastatin (ZOCOR) 20 MG tablet, Take 20 mg by mouth, Disp: , Rfl:      topiramate (TOPAMAX) 50 MG tablet, Take 1 tab by mouth every morning and 2 tabs in the evening., Disp: , Rfl:      traZODone (DESYREL) 50 MG tablet, Take 125 mg by mouth, Disp: , Rfl:       Allergies   Allergen Reactions     Adhesive Tape Hives     bandaids  bandaids  bandaids       Bupropion Hives     Methocarbamol Hives     Ampicillin Diarrhea     Other reaction(s): Abdominal pain, Gastrointestinal  Terrible diarrhea  Terrible diarrhea       Atorvastatin Muscle Pain (Myalgia)     Other reaction(s): Myalgias  Tolerates simvastatin 10mg daily.  Tolerates simvastatin 10mg daily.       Cephalexin Other (See Comments)     Swelling   Swelling   Swelling   Swelling        Levofloxacin      Other reaction(s): Dizziness  Dizzy, lightheaded, loss of appetite  Dizzy, lightheaded, loss of appetite       Nortriptyline Other (See Comments)     Racing heart  Racing heart  Racing heart  Racing heart       Latex Rash       Video examination grossly non-focal.    MRI of the brain from 7/23/2021 reviewed and described above.     AP: 65 F with SCLC and progressive enlargement of a right BM post-SRS. We reviewed three management options, including 1) continued observation 2) stereotactic needle biopsy followed by laser ablation 3) surgical resection. Rationale, risks, and benefits for each approach were reviewed. All questions were answered. The patient and her family has opted to proceed with the option of stereotactic needle biopsy followed by laser ablation. I will proceed to schedule the  procedure.

## 2021-07-30 NOTE — LETTER
"    7/30/2021         RE: Tamiko Nunez  [address] [address] [facility] [address] [facility] [address]        Dear Colleague,    Thank you for referring your patient, Tamiko Nunez, to the [facility]. Please see a copy of my visit note below.    Tamiko is a 65 year old who is being evaluated via a billable video visit.      How would you like to obtain your AVS? Mail a copy  If the video visit is dropped, the invitation should be resent by: Other e-mail: [email or web address]  Will anyone else be joining your video visit? No     Vitals - Patient Reported  Weight (Patient Reported): 94.8 kg (209 lb)  Height (Patient Reported): 170.2 cm (5' 7\")  BMI (Based on Pt Reported Ht/Wt): 32.73  Pain Score: No Pain (0)    Charlene SUAREZ        Video Start Time: 3:00 PM  Video-Visit Details    Type of service:  Video Visit    Video End Time:3:30 PM    Originating Location (pt. Location): Home    Distant Location (provider location):  [facility]     Platform used for Video Visit: [facility]     Neurosurgery Clinic Note    65 F with stage IV small cell lung CA s/p SRS to a right temporal/occipital BM. Serial MRI's since SRS showed progressive enlargement of the lesion, now measuring ~1.5 cm CE+ with surrounding FLAIR. The patient presents for consideration of tissue diagnosis    On review of systems, the patient notes intermittent visual hallucination (\"seeing things that are not there\")    PMH:  Thrombocytopenia  Small cell lung CA      Current Outpatient Medications:      alum & mag hydroxide-simethicone (MAALOX) 200-200-20 MG/5ML SUSP suspension, Take 30 mLs by mouth, Disp: , Rfl:      benzonatate (TESSALON) 200 MG capsule, TAKE 1 CAPSULE BY MOUTH THREE TIMES A DAY AS NEEDED (FOR CHEMORADIATION INDUCED COUGH)., Disp: , Rfl:      Biotin 10 MG TABS tablet, , Disp: , Rfl:      cholecalciferol 50 MCG (2000 UT) tablet, Take 2,000 Units by mouth, Disp: , Rfl:      dexamethasone (DECADRON) 4 " MG tablet, Take 1 tab (4 mg) orally twice daily for three days. Start the second day of chemotherapy., Disp: , Rfl:      FLUoxetine (PROZAC) 20 MG capsule, Take 20 mg by mouth, Disp: , Rfl:      fluticasone (FLONASE) 50 MCG/ACT nasal spray, 1 spray, Disp: , Rfl:      folic acid 800 MCG tablet, Take 800 mcg by mouth, Disp: , Rfl:      lidocaine (XYLOCAINE) 2 % solution, , Disp: , Rfl:      nystatin (MYCOSTATIN) 283553 UNIT/GM external powder, , Disp: , Rfl:      omeprazole (PRILOSEC) 20 MG DR capsule, Take 20 mg by mouth, Disp: , Rfl:      prochlorperazine (COMPAZINE) 10 MG tablet, Take 10 mg by mouth, Disp: , Rfl:      simvastatin (ZOCOR) 20 MG tablet, Take 20 mg by mouth, Disp: , Rfl:      topiramate (TOPAMAX) 50 MG tablet, Take 1 tab by mouth every morning and 2 tabs in the evening., Disp: , Rfl:      traZODone (DESYREL) 50 MG tablet, Take 125 mg by mouth, Disp: , Rfl:       Allergies   Allergen Reactions     Adhesive Tape Hives     bandaids  bandaids  bandaids       Bupropion Hives     Methocarbamol Hives     Ampicillin Diarrhea     Other reaction(s): Abdominal pain, Gastrointestinal  Terrible diarrhea  Terrible diarrhea       Atorvastatin Muscle Pain (Myalgia)     Other reaction(s): Myalgias  Tolerates simvastatin 10mg daily.  Tolerates simvastatin 10mg daily.       Cephalexin Other (See Comments)     Swelling   Swelling   Swelling   Swelling        Levofloxacin      Other reaction(s): Dizziness  Dizzy, lightheaded, loss of appetite  Dizzy, lightheaded, loss of appetite       Nortriptyline Other (See Comments)     Racing heart  Racing heart  Racing heart  Racing heart       Latex Rash       Video examination grossly non-focal.    MRI of the brain from 7/23/2021 reviewed and described above.     AP: 65 F with SCLC and progressive enlargement of a right BM post-SRS. We reviewed three management options, including 1) continued observation 2) stereotactic needle biopsy followed by laser ablation 3) surgical  resection. Rationale, risks, and benefits for each approach were reviewed. All questions were answered. The patient and her family has opted to proceed with the option of stereotactic needle biopsy followed by laser ablation. I will proceed to schedule the procedure.      Again, thank you for allowing me to participate in the care of your patient.        Sincerely,        Aleks Crouch MD

## 2021-08-03 NOTE — TELEPHONE ENCOUNTER
FUTURE VISIT INFORMATION      SURGERY INFORMATION:    Date: 21    Location: [facility] OR    Surgeon:  Aleks Crouch MD    Anesthesia Type:  General    Procedure: MRI LASER ABLATION, WITH OPTICAL TRACKING SYSTEM and stereotactic needle biopsy    Consult: virtual visit 21    RECORDS REQUESTED FROM:       Primary Care Provider: Ashwini Hays MD  - [facility]    Most recent EKG+ Tracin20- [facility]    Most recent Cardiac Stress Test: 4/3/19- [facility]

## 2021-08-04 NOTE — PATIENT INSTRUCTIONS
Preparing for Your Surgery      Name:  Tamiko Nunez   MRN:  [id number]   :  1955   Today's Date:  2021       Arriving for surgery:  Surgery date:  21  Arrival time:  9:30 am  (MRI 10 am)    Restrictions due to COVID 19:       One visitor is allowed in the Pre Op area. When you go into surgery, one visitor is allowed to wait in the Surgery Waiting Room       (provided there is enough space to social distance).   After surgery- Two visitors are allowed at a time if you have a private room and one visitor is allowed for those in a semi-private room.   Every 4 days the visitor(s) can rotate. During the 4 day period, the visitor(s) must be consistent. No visitors under the age of 18 years old.   Visiting Hours: 8 am - 8:30 pm   No ill visitors.   All visitors must wear face mask.     parking is available for anyone with mobility limitations or disabilities.  ([facility]  24 hours/ 7 days a week; [facility]  7 am- 3:30 pm, Mon- Fri)    Please come to:     [facility] [address]  [address] [place] [address]     -    On arrival to hospital, you will be asked some screening questions and directed to Patient Registration. Patient Registration will then give you further instructions. [phone]?     - ?If you are in need of directions, wheelchair or escort please stop at the Information Desk in the lobby.  Inform the information person that you are here for surgery; a wheelchair and escort to [address] will be provided.?     What can I eat or drink?  -  You may eat and drink normally for up to 8 hours before your surgery. (Until 5 am)  -  You may have clear liquids until 2 hours before surgery. (Until 9:30 am- Stop on arrival to hospital )    Examples of clear liquids:  Water  Clear broth  Juices (apple, white grape, white cranberry  and cider) without pulp  Noncarbonated, powder based beverages  (lemonade and Steven-Aid)  Sodas  (Mundo, 7-Up, ginger ale and natalia)  Coffee or tea (without milk or cream)  Gatorade    -  No Alcohol for at least 24 hours before surgery     Which medicines can I take?  Hold Aspirin for 7 days before surgery.   Hold Multivitamins for 7 days before surgery.  Hold Supplements for 7 days before surgery.  Hold Ibuprofen (Advil, Motrin) for 1 day before surgery--unless otherwise directed by surgeon.  Hold Naproxen (Aleve) for 4 days before surgery.    -  DO NOT take these medications the day of surgery:  Biotin, Cholecalciferol, Folic Acid,     -  PLEASE TAKE these medications the day of surgery:  Benzonatate (Tessalon), Omeprazole, Topiramate (Topamax),   Acetaminophen (Tylenol) if needed  Flonase nasal spray if needed  Prochlorperazine (Compazine) if needed    How do I prepare myself?  - Please take 2 showers before surgery using Scrubcare or Hibiclens soap.    Use this soap only from the neck to your toes.     Leave the soap on your skin for one minute--then rinse thoroughly.      You may use your own shampoo and conditioner; no other hair products.   - Please remove all jewelry and body piercings.  - No lotions, deodorants or fragrance.  - No makeup or fingernail polish.   - Bring your ID and insurance card.    -If you have a Deep Brain Stimulator, Spinal Cord Stimulator or any neuro stimulator device---you must bring the remote control to the hospital     - All patients are required to have a Covid-19 test within 4 days of surgery/procedure.      -Patients will be contacted by the [facility] scheduling team within 1 week of surgery to make an appointment.      - Patients may call the Scheduling team at [phone] if they have not been scheduled within 4 days of  surgery.      Questions or Concerns:    - For any questions regarding the day of surgery or your hospital stay, please contact the Pre Admission Nursing Office at [phone].       - If you have health changes between today and your surgery  please call your surgeon.       For questions after surgery please call your surgeons office.

## 2021-08-04 NOTE — H&P
Pre-Operative H & P         Video-Visit Details    Type of service:  Video Visit    Patient verbally consented to video service today: YES      Video Start Time: 1246  Video End Time (time video stopped): 1308    Originating Location (pt. Location): Home    Distant Location (provider location):  home    Mode of Communication:  Video Conference via [employer]        CC:  Preoperative exam to assess for increased cardiopulmonary risk while undergoing surgery and anesthesia.    Date of Encounter: 8/4/2021  Primary Care Physician:  Ashwini Hays  Associated diagnosis: brain mets    HPI  Tamiko Nunez is a 65 year old female who presents for pre-operative H & P in preparation for MRI LASER ABLATION, WITH OPTICAL TRACKING SYSTEM and stereotactic needle biopsy with Dr. Crouch on 8/11/21 at [facility]. Patient is being evaluated for comorbid conditions of dyslipidemia, SCLC, nontoxic multinodular goiter, GERD, cervical spinal stenosis, PONV      Ms. Nunez has a history of small cell lung cancer s/p SRS to a right temporal/ occipital brain met. She is s/p L cellebellar excision 9/2020, SRS 10/28-11/2/2020, 1/29/21. She also underwent carboplatin 10/14/2020-12/21/21. Lurbinectedin 3/22/21-7/26/21.   She follows with oncology at [facility]. She has continued to complete serial imaging that has revealed progressive enlargement of the lesion. She was referred to Dr. Crouch for further evaluation of this lesion. She is now scheduled for the above procedure.      History is obtained from the patient and chart review.      Past Medical History  Past Medical History:   Diagnosis Date     Brain metastasis (H)      Cervical stenosis of spinal canal      Dyslipidemia      Esophageal reflux      Nontoxic multinodular goiter      SCLC (small cell lung carcinoma) (H)        Past Surgical History  No past surgical history on file.    Hx of Blood transfusions/reactions: denies      Hx of abnormal bleeding or anti-platelet use: denies    Menstrual history: No LMP recorded. Patient has had a hysterectomy.    Steroid use in the last year: denies chronic use- decadron with chemotherapy    Personal or FH with difficulty with Anesthesia:  Patient has a history of PONV, but has no family history of anesthesia complications.      Prior to Admission Medications  Current Outpatient Medications   Medication Sig Dispense Refill     alum & mag hydroxide-simethicone (MAALOX) 200-200-20 MG/5ML SUSP suspension Take 30 mLs by mouth once as needed        benzonatate (TESSALON) 200 MG capsule 3 times daily        Biotin 10 MG TABS tablet every morning        cholecalciferol 50 MCG (2000 UT) tablet Take 2,000 Units by mouth every morning        FLUoxetine (PROZAC) 20 MG capsule Take 20 mg by mouth every evening        fluticasone (FLONASE) 50 MCG/ACT nasal spray 1 spray daily as needed        folic acid 800 MCG tablet Take 800 mcg by mouth every morning        nystatin (MYCOSTATIN) 722505 UNIT/GM external powder Apply topically daily as needed        omeprazole (PRILOSEC) 20 MG DR capsule Take 20 mg by mouth 2 times daily        simvastatin (ZOCOR) 20 MG tablet Take 20 mg by mouth At Bedtime        topiramate (TOPAMAX) 50 MG tablet 2 times daily        traZODone (DESYREL) 50 MG tablet Take 125 mg by mouth At Bedtime        dexamethasone (DECADRON) 4 MG tablet Take 1 tab (4 mg) orally twice daily for three days. Start the second day of chemotherapy.       lidocaine (XYLOCAINE) 2 % solution  (Patient not taking: Reported on 8/4/2021)       prochlorperazine (COMPAZINE) 10 MG tablet Take 10 mg by mouth         Allergies  Allergies   Allergen Reactions     Adhesive Tape Hives     bandaids  bandaids  bandaids       Bupropion Hives     Methocarbamol Hives     Ampicillin Diarrhea     Other reaction(s): Abdominal pain, Gastrointestinal  Terrible diarrhea  Terrible diarrhea       Atorvastatin Muscle Pain (Myalgia)      Other reaction(s): Myalgias  Tolerates simvastatin 10mg daily.  Tolerates simvastatin 10mg daily.       Cephalexin Other (See Comments)     Swelling   Swelling   Swelling   Swelling        Levofloxacin      Other reaction(s): Dizziness  Dizzy, lightheaded, loss of appetite  Dizzy, lightheaded, loss of appetite       Nortriptyline Other (See Comments)     Racing heart  Racing heart  Racing heart  Racing heart       Latex Rash       Social History  Social History     Socioeconomic History     Marital status:      Spouse name: Not on file     Number of children: Not on file     Years of education: Not on file     Highest education level: Not on file   Occupational History     Not on file   Tobacco Use     Smoking status: Former Smoker     Quit date:      Years since quittin.6     Smokeless tobacco: Former User   Substance and Sexual Activity     Alcohol use: Not on file     Drug use: Not on file     Sexual activity: Not on file   Other Topics Concern     Not on file   Social History Narrative     Not on file     Social Determinants of Health     Financial Resource Strain:      Difficulty of Paying Living Expenses:    Food Insecurity:      Worried About Running Out of Food in the Last Year:      Ran Out of Food in the Last Year:    Transportation Needs:      Lack of Transportation (Medical):      Lack of Transportation (Non-Medical):    Physical Activity:      Days of Exercise per Week:      Minutes of Exercise per Session:    Stress:      Feeling of Stress :    Social Connections:      Frequency of Communication with Friends and Family:      Frequency of Social Gatherings with Friends and Family:      Attends Islam Services:      Active Member of Clubs or Organizations:      Attends Club or Organization Meetings:      Marital Status:    Intimate Partner Violence:      Fear of Current or Ex-Partner:      Emotionally Abused:      Physically Abused:      Sexually Abused:        Family History  No  family history on file.      ROS/MED HX  ENT/Pulmonary: Comment: SCLC   (-) tobacco use   Neurologic:  - neg neurologic ROS     Cardiovascular:     (+) Dyslipidemia -----Previous cardiac testing   Echo: Date: Results:    Stress Test: Date: 2019 Results:    ECG Reviewed: Date: 9/2020 Results:    Cath: Date: Results:   (-) taking anticoagulants/antiplatelets   METS/Exercise Tolerance:     Hematologic:  - neg hematologic  ROS  (-) history of blood transfusion   Musculoskeletal: Comment: Cervical stenosis      GI/Hepatic:     (+) GERD,     Renal/Genitourinary:  - neg Renal ROS     Endo: Comment: Nontoxic multinodular goiter      Psychiatric/Substance Use:  - neg psychiatric ROS     Infectious Disease:  - neg infectious disease ROS     Malignancy: Comment: SCLC with brain mets s/p surgery and chemotherapy   (+) Malignancy,     Other:            The complete review of systems is negative other than noted in the HPI or here.    0 lbs 0 oz  Data Unavailable   There is no height or weight on file to calculate BMI.       Physical Exam  Constitutional: Awake, alert, cooperative, no apparent distress, and appears stated age.  Respiratory: non labored breathing   Neuropsychiatric: Calm, cooperative. Normal affect.     Please refer to the physical examination documented by the anesthesiologist in the anesthesia record on the day of surgery    Labs: (personally reviewed)  7/26/21  SODIUM  - 145 mmol/L 147High         POTASSIUM OP 3.6 - 5.1 mmol/L 4.0        CHLORIDE OP 98 - 108 mmol/L 111High         CARBON DIOXIDE OP 18 - 33 mmol/L 27        BUN (UREA NITRO) OP 7 - 22 mg/dL 10        CREATININE OP 0.60 - 1.20 mg/dL 0.80        EST GFR (CKD-EPI) OP >60 mL/min >60        EST GFR IF  AM OP >60 mL/min >60        GLUCOSE OP 73 - 118 mg/dL 126High         CALCIUM, SERUM OP 8.0 - 10.3 mg/dL 9.7        ANION GAP OP -4.0 - 12.0 mmol/L 9.0        ALBUMIN OP 3.3 - 5.5 g/dL 3.6        BILIRUBIN-TOTAL OP 0.2 - 1.6 mg/dL 0.5         ALKALINE P'TASE OP 42 - 141 IU/L 113        PROTEIN TOTAL OP 6.4 - 8.1 g/dL 7.3        AST (SGOT) OP 11 - 38 IU/L 23        ALT (SGPT) OP 10 - 47 IU/L 20      WBC OP 4.3 - 10.8 K/UL 3.5Low         RBC OP 4.20 - 5.40 M/UL 3.70Low         HEMOGLOBIN OP 12.0 - 16.0 gm/dL 12.6        HEMATOCRIT OP 36.0 - 48.0 % 38.0        MCV OP 80 - 100 fl 103High         MCH OP 27.0 - 33.0 pg 34.1High         MCHC OP 33.0 - 36.0 gm/dL 33.2        RDW OP 11.5 - 14.5 % 12.8        PLATELET COUNT  - 400 K/        MPV OP 6.5 - 12.0  9.4        PMN % OP  % 63.8        LYMPHOCYTE % OP  % 17.5        MONOCYTE % OP  % 15.8        EOSINOPHIL % OP  % 2.3        BASOPHIL % OP  % 0.6        PMN ABSOLUTE OP 1.80 - 7.80 K/uL 2.23        LYMPHOCYTE ABSOLUTE OP 1.00 - 4.00 K/uL 0.61Low         MONOCYTE ABSOLUTE OP 0.00 - 1.00 K/uL 0.55        EOSINOPHIL ABSOLUTE OP 0.00 - 0.45 K/uL 0.08        BASOPHIL ABSOLUTE OP 0.00 - 0.20 K/uL 0.02        EKG 9/2020   sinus rhythm      Stress test 4/2019   Impression    1. Myocardial perfusion imaging at rest and post stress is normal.    2. The stress EKG is normal without evidence of ischemia. No stress    induced arrhythmia.      3. Left ventricular systolic function was normal. No regional wall motion    abnormalities.    4. Semi-quantitative calcium score is zero. This score would put the    patient in the top 45 percentile for age, gender matched asymptomatic    group.    Coronary calcification is prevalent with advancing age and quantification    may not be helpful beyond age 75. Coronary calcifications are a marker for    coronary atherosclerosis. As patients age, coronary calcium becomes more    prevalent. The higher the calcium score, the higher the likelihood of    obstructive coronary obstruction in the individual patient. High amount of    calcium is felt to correlate to a moderate to high risk of cardiovascular    event in the next 2-5 years. (Reference norms: Minimal Calcium: 1-10;  Mild    Calcium: ; Moderate Calcium: 101-400; High Calcium: 401 and above).    5. There are no prior studies available for comparison.    6. Please see radiology report for non-cardiac findings.        Findings    Â  The overall quality of the study is good without patient motion.    Â  Attenuation artifact was absent.    Â  Left ventricular cavity is normal.    Â  There is no evidence of abnormal extracardiac activity.    Â  The right ventricle is normal.      Â  SPECT images demonstrate homogeneous tracer distribution throughout the    myocardium at rest and post stress.      Â  Gated stress SPECT imaging reveals normal myocardial thickening and wall    motion. The left ventricular ejection fraction was normal 60%.    Â  The rest EKG: Normal sinus rhythm, normal QRS complex and normal ST-T    segement.       ASSESSMENT and PLAN  Tamiko Nunez is a 65 year old female scheduled for MRI LASER ABLATION, WITH OPTICAL TRACKING SYSTEM and stereotactic needle biopsy on 8/11/21 by Dr. Crouch in treatment of Brain metastasis.  PAC referral for risk assessment and optimization for anesthesia with comorbid conditions of dyslipidemia, SCLC, nontoxic multinodular goiter, GERD, cervical spinal stenosis, PONV:      Pre-operative considerations:  1.  Cardiac:  Functional status- METS 1. Denies chest pain. Reports she has been feeling more fatigued and winded since starting treatment for her cancer.  high risk surgery with 0.4% (RCRI #) risk of major adverse cardiac event.  ~dyslipidemia using zocor  ~previous cardiac testing above    2.  Pulm:  FARIDA risk: intermediate  ~non smoker    3.  GI:  H/O PONV, anti-emetic intervention recommended.    4.  ONC: SCLC with brain mets. She is s/p L cellebellar excision 9/2020, SRS 10/28-11/2/2020, 1/29/21. She also underwent carboplatin 10/14/2020-12/21/21. Lurbinectedin 3/22/21-7/26/21.   She follows with oncology at [facility]. She was referred to Dr. Crouch for her brain mets and is  now scheduled for the above procedure.     5. endo: h/o nontoxic multinodular goiter    6. msk: spinal stenosis, h/o cervical and lumbar proceudre    VTE risk: 3%    Patient is optimized and is acceptable candidate for the proposed procedure.  No further diagnostic evaluation is needed.    **Physical exam and vital signs not completed today as this visit was scheduled as a virtual visit during Covid 19 pandemic. Physical exam should be completed the DOS in pre-op**    Patient discussed with Dr. Smith    Final plan per anesthesiologist on day of surgery.     Arrival time, NPO, shower and medication instructions provided by nursing staff today.    50 minutes were spent completing chart review, seeing the patient, reviewing labs and test results, reviewing with anesthesia and completing documentation       Jennifer Lopez PA-C  Preoperative Assessment Center  [facility]  Phone: [phone]  Fax: [phone]

## 2021-08-04 NOTE — PROGRESS NOTES
Marcos is a 65 year old who is being evaluated via a billable video visit.      How would you like to obtain your AVS? Email- [email or web address]  If the video visit is dropped, the invitation should be resent by: Send to e-mail at: [email or web address]    HPI         Review of Systems         Physical Exam

## 2021-08-04 NOTE — H&P (VIEW-ONLY)
Pre-Operative H & P         Video-Visit Details    Type of service:  Video Visit    Patient verbally consented to video service today: YES      Video Start Time: 1246  Video End Time (time video stopped): 1308    Originating Location (pt. Location): Home    Distant Location (provider location):  home    Mode of Communication:  Video Conference via [employer]        CC:  Preoperative exam to assess for increased cardiopulmonary risk while undergoing surgery and anesthesia.    Date of Encounter: 8/4/2021  Primary Care Physician:  Ashwini Hays  Associated diagnosis: brain mets    HPI  Tamiko Nunez is a 65 year old female who presents for pre-operative H & P in preparation for MRI LASER ABLATION, WITH OPTICAL TRACKING SYSTEM and stereotactic needle biopsy with Dr. Crouch on 8/11/21 at [facility]. Patient is being evaluated for comorbid conditions of dyslipidemia, SCLC, nontoxic multinodular goiter, GERD, cervical spinal stenosis, PONV      Ms. Nunez has a history of small cell lung cancer s/p SRS to a right temporal/ occipital brain met. She is s/p L cellebellar excision 9/2020, SRS 10/28-11/2/2020, 1/29/21. She also underwent carboplatin 10/14/2020-12/21/21. Lurbinectedin 3/22/21-7/26/21.   She follows with oncology at [facility]. She has continued to complete serial imaging that has revealed progressive enlargement of the lesion. She was referred to Dr. Crouch for further evaluation of this lesion. She is now scheduled for the above procedure.      History is obtained from the patient and chart review.      Past Medical History  Past Medical History:   Diagnosis Date     Brain metastasis (H)      Cervical stenosis of spinal canal      Dyslipidemia      Esophageal reflux      Nontoxic multinodular goiter      SCLC (small cell lung carcinoma) (H)        Past Surgical History  No past surgical history on file.    Hx of Blood transfusions/reactions: denies      Hx of abnormal bleeding or anti-platelet use: denies    Menstrual history: No LMP recorded. Patient has had a hysterectomy.    Steroid use in the last year: denies chronic use- decadron with chemotherapy    Personal or FH with difficulty with Anesthesia:  Patient has a history of PONV, but has no family history of anesthesia complications.      Prior to Admission Medications  Current Outpatient Medications   Medication Sig Dispense Refill     alum & mag hydroxide-simethicone (MAALOX) 200-200-20 MG/5ML SUSP suspension Take 30 mLs by mouth once as needed        benzonatate (TESSALON) 200 MG capsule 3 times daily        Biotin 10 MG TABS tablet every morning        cholecalciferol 50 MCG (2000 UT) tablet Take 2,000 Units by mouth every morning        FLUoxetine (PROZAC) 20 MG capsule Take 20 mg by mouth every evening        fluticasone (FLONASE) 50 MCG/ACT nasal spray 1 spray daily as needed        folic acid 800 MCG tablet Take 800 mcg by mouth every morning        nystatin (MYCOSTATIN) 171115 UNIT/GM external powder Apply topically daily as needed        omeprazole (PRILOSEC) 20 MG DR capsule Take 20 mg by mouth 2 times daily        simvastatin (ZOCOR) 20 MG tablet Take 20 mg by mouth At Bedtime        topiramate (TOPAMAX) 50 MG tablet 2 times daily        traZODone (DESYREL) 50 MG tablet Take 125 mg by mouth At Bedtime        dexamethasone (DECADRON) 4 MG tablet Take 1 tab (4 mg) orally twice daily for three days. Start the second day of chemotherapy.       lidocaine (XYLOCAINE) 2 % solution  (Patient not taking: Reported on 8/4/2021)       prochlorperazine (COMPAZINE) 10 MG tablet Take 10 mg by mouth         Allergies  Allergies   Allergen Reactions     Adhesive Tape Hives     bandaids  bandaids  bandaids       Bupropion Hives     Methocarbamol Hives     Ampicillin Diarrhea     Other reaction(s): Abdominal pain, Gastrointestinal  Terrible diarrhea  Terrible diarrhea       Atorvastatin Muscle Pain (Myalgia)      Other reaction(s): Myalgias  Tolerates simvastatin 10mg daily.  Tolerates simvastatin 10mg daily.       Cephalexin Other (See Comments)     Swelling   Swelling   Swelling   Swelling        Levofloxacin      Other reaction(s): Dizziness  Dizzy, lightheaded, loss of appetite  Dizzy, lightheaded, loss of appetite       Nortriptyline Other (See Comments)     Racing heart  Racing heart  Racing heart  Racing heart       Latex Rash       Social History  Social History     Socioeconomic History     Marital status:      Spouse name: Not on file     Number of children: Not on file     Years of education: Not on file     Highest education level: Not on file   Occupational History     Not on file   Tobacco Use     Smoking status: Former Smoker     Quit date:      Years since quittin.6     Smokeless tobacco: Former User   Substance and Sexual Activity     Alcohol use: Not on file     Drug use: Not on file     Sexual activity: Not on file   Other Topics Concern     Not on file   Social History Narrative     Not on file     Social Determinants of Health     Financial Resource Strain:      Difficulty of Paying Living Expenses:    Food Insecurity:      Worried About Running Out of Food in the Last Year:      Ran Out of Food in the Last Year:    Transportation Needs:      Lack of Transportation (Medical):      Lack of Transportation (Non-Medical):    Physical Activity:      Days of Exercise per Week:      Minutes of Exercise per Session:    Stress:      Feeling of Stress :    Social Connections:      Frequency of Communication with Friends and Family:      Frequency of Social Gatherings with Friends and Family:      Attends Mandaen Services:      Active Member of Clubs or Organizations:      Attends Club or Organization Meetings:      Marital Status:    Intimate Partner Violence:      Fear of Current or Ex-Partner:      Emotionally Abused:      Physically Abused:      Sexually Abused:        Family History  No  family history on file.      ROS/MED HX  ENT/Pulmonary: Comment: SCLC   (-) tobacco use   Neurologic:  - neg neurologic ROS     Cardiovascular:     (+) Dyslipidemia -----Previous cardiac testing   Echo: Date: Results:    Stress Test: Date: 2019 Results:    ECG Reviewed: Date: 9/2020 Results:    Cath: Date: Results:   (-) taking anticoagulants/antiplatelets   METS/Exercise Tolerance:     Hematologic:  - neg hematologic  ROS  (-) history of blood transfusion   Musculoskeletal: Comment: Cervical stenosis      GI/Hepatic:     (+) GERD,     Renal/Genitourinary:  - neg Renal ROS     Endo: Comment: Nontoxic multinodular goiter      Psychiatric/Substance Use:  - neg psychiatric ROS     Infectious Disease:  - neg infectious disease ROS     Malignancy: Comment: SCLC with brain mets s/p surgery and chemotherapy   (+) Malignancy,     Other:            The complete review of systems is negative other than noted in the HPI or here.    0 lbs 0 oz  Data Unavailable   There is no height or weight on file to calculate BMI.       Physical Exam  Constitutional: Awake, alert, cooperative, no apparent distress, and appears stated age.  Respiratory: non labored breathing   Neuropsychiatric: Calm, cooperative. Normal affect.     Please refer to the physical examination documented by the anesthesiologist in the anesthesia record on the day of surgery    Labs: (personally reviewed)  7/26/21  SODIUM  - 145 mmol/L 147High         POTASSIUM OP 3.6 - 5.1 mmol/L 4.0        CHLORIDE OP 98 - 108 mmol/L 111High         CARBON DIOXIDE OP 18 - 33 mmol/L 27        BUN (UREA NITRO) OP 7 - 22 mg/dL 10        CREATININE OP 0.60 - 1.20 mg/dL 0.80        EST GFR (CKD-EPI) OP >60 mL/min >60        EST GFR IF  AM OP >60 mL/min >60        GLUCOSE OP 73 - 118 mg/dL 126High         CALCIUM, SERUM OP 8.0 - 10.3 mg/dL 9.7        ANION GAP OP -4.0 - 12.0 mmol/L 9.0        ALBUMIN OP 3.3 - 5.5 g/dL 3.6        BILIRUBIN-TOTAL OP 0.2 - 1.6 mg/dL 0.5         ALKALINE P'TASE OP 42 - 141 IU/L 113        PROTEIN TOTAL OP 6.4 - 8.1 g/dL 7.3        AST (SGOT) OP 11 - 38 IU/L 23        ALT (SGPT) OP 10 - 47 IU/L 20      WBC OP 4.3 - 10.8 K/UL 3.5Low         RBC OP 4.20 - 5.40 M/UL 3.70Low         HEMOGLOBIN OP 12.0 - 16.0 gm/dL 12.6        HEMATOCRIT OP 36.0 - 48.0 % 38.0        MCV OP 80 - 100 fl 103High         MCH OP 27.0 - 33.0 pg 34.1High         MCHC OP 33.0 - 36.0 gm/dL 33.2        RDW OP 11.5 - 14.5 % 12.8        PLATELET COUNT  - 400 K/        MPV OP 6.5 - 12.0  9.4        PMN % OP  % 63.8        LYMPHOCYTE % OP  % 17.5        MONOCYTE % OP  % 15.8        EOSINOPHIL % OP  % 2.3        BASOPHIL % OP  % 0.6        PMN ABSOLUTE OP 1.80 - 7.80 K/uL 2.23        LYMPHOCYTE ABSOLUTE OP 1.00 - 4.00 K/uL 0.61Low         MONOCYTE ABSOLUTE OP 0.00 - 1.00 K/uL 0.55        EOSINOPHIL ABSOLUTE OP 0.00 - 0.45 K/uL 0.08        BASOPHIL ABSOLUTE OP 0.00 - 0.20 K/uL 0.02        EKG 9/2020   sinus rhythm      Stress test 4/2019   Impression    1. Myocardial perfusion imaging at rest and post stress is normal.    2. The stress EKG is normal without evidence of ischemia. No stress    induced arrhythmia.      3. Left ventricular systolic function was normal. No regional wall motion    abnormalities.    4. Semi-quantitative calcium score is zero. This score would put the    patient in the top 45 percentile for age, gender matched asymptomatic    group.    Coronary calcification is prevalent with advancing age and quantification    may not be helpful beyond age 75. Coronary calcifications are a marker for    coronary atherosclerosis. As patients age, coronary calcium becomes more    prevalent. The higher the calcium score, the higher the likelihood of    obstructive coronary obstruction in the individual patient. High amount of    calcium is felt to correlate to a moderate to high risk of cardiovascular    event in the next 2-5 years. (Reference norms: Minimal Calcium: 1-10;  Mild    Calcium: ; Moderate Calcium: 101-400; High Calcium: 401 and above).    5. There are no prior studies available for comparison.    6. Please see radiology report for non-cardiac findings.        Findings    Â  The overall quality of the study is good without patient motion.    Â  Attenuation artifact was absent.    Â  Left ventricular cavity is normal.    Â  There is no evidence of abnormal extracardiac activity.    Â  The right ventricle is normal.      Â  SPECT images demonstrate homogeneous tracer distribution throughout the    myocardium at rest and post stress.      Â  Gated stress SPECT imaging reveals normal myocardial thickening and wall    motion. The left ventricular ejection fraction was normal 60%.    Â  The rest EKG: Normal sinus rhythm, normal QRS complex and normal ST-T    segement.       ASSESSMENT and PLAN  Tamiko Nunez is a 65 year old female scheduled for MRI LASER ABLATION, WITH OPTICAL TRACKING SYSTEM and stereotactic needle biopsy on 8/11/21 by Dr. Crouch in treatment of Brain metastasis.  PAC referral for risk assessment and optimization for anesthesia with comorbid conditions of dyslipidemia, SCLC, nontoxic multinodular goiter, GERD, cervical spinal stenosis, PONV:      Pre-operative considerations:  1.  Cardiac:  Functional status- METS 1. Denies chest pain. Reports she has been feeling more fatigued and winded since starting treatment for her cancer.  high risk surgery with 0.4% (RCRI #) risk of major adverse cardiac event.  ~dyslipidemia using zocor  ~previous cardiac testing above    2.  Pulm:  FARIDA risk: intermediate  ~non smoker    3.  GI:  H/O PONV, anti-emetic intervention recommended.    4.  ONC: SCLC with brain mets. She is s/p L cellebellar excision 9/2020, SRS 10/28-11/2/2020, 1/29/21. She also underwent carboplatin 10/14/2020-12/21/21. Lurbinectedin 3/22/21-7/26/21.   She follows with oncology at [facility]. She was referred to Dr. Crouch for her brain mets and is  now scheduled for the above procedure.     5. endo: h/o nontoxic multinodular goiter    6. msk: spinal stenosis, h/o cervical and lumbar proceudre    VTE risk: 3%    Patient is optimized and is acceptable candidate for the proposed procedure.  No further diagnostic evaluation is needed.    **Physical exam and vital signs not completed today as this visit was scheduled as a virtual visit during Covid 19 pandemic. Physical exam should be completed the DOS in pre-op**    Patient discussed with Dr. Smith    Final plan per anesthesiologist on day of surgery.     Arrival time, NPO, shower and medication instructions provided by nursing staff today.    50 minutes were spent completing chart review, seeing the patient, reviewing labs and test results, reviewing with anesthesia and completing documentation       Jennifer Lopez PA-C  Preoperative Assessment Center  [facility]  Phone: [phone]  Fax: [phone]

## 2021-08-04 NOTE — ANESTHESIA PREPROCEDURE EVALUATION
Anesthesia Pre-Procedure Evaluation    Patient: Tamiko Nunez   MRN: [id number] : 1955        Preoperative Diagnosis: * No surgery found *   Procedure :      Past Medical History:   Diagnosis Date     Brain metastasis (H)      Cervical stenosis of spinal canal      Dyslipidemia      Esophageal reflux      Nontoxic multinodular goiter      SCLC (small cell lung carcinoma) (H)       No past surgical history on file.   Allergies   Allergen Reactions     Adhesive Tape Hives     bandaids  bandaids  bandaids       Bupropion Hives     Methocarbamol Hives     Ampicillin Diarrhea     Other reaction(s): Abdominal pain, Gastrointestinal  Terrible diarrhea  Terrible diarrhea       Atorvastatin Muscle Pain (Myalgia)     Other reaction(s): Myalgias  Tolerates simvastatin 10mg daily.  Tolerates simvastatin 10mg daily.       Cephalexin Other (See Comments)     Swelling   Swelling   Swelling   Swelling        Levofloxacin      Other reaction(s): Dizziness  Dizzy, lightheaded, loss of appetite  Dizzy, lightheaded, loss of appetite       Nortriptyline Other (See Comments)     Racing heart  Racing heart  Racing heart  Racing heart       Latex Rash      Social History     Tobacco Use     Smoking status: Not on file   Substance Use Topics     Alcohol use: Not on file      Wt Readings from Last 1 Encounters:   No data found for Wt        Anesthesia Evaluation   Pt has had prior anesthetic.     History of anesthetic complications  - PONV.      ROS/MED HX  ENT/Pulmonary: Comment: SCLC   (-) tobacco use   Neurologic: Comment: Brain mets      Cardiovascular:     (+) Dyslipidemia -----Previous cardiac testing   Echo: Date: Results:    Stress Test: Date:  Results:    ECG Reviewed: Date: 2020 Results:    Cath: Date: Results:   (-) taking anticoagulants/antiplatelets   METS/Exercise Tolerance: 3 - Able to walk 1-2 blocks without stopping Comment: Uses walker if she leaves home, limited activity currently. Reports low stamina  and imbalance.  Can walk at least a block with her walker without issue   Hematologic:  - neg hematologic  ROS  (-) history of blood transfusion   Musculoskeletal: Comment: Cervical stenosis      GI/Hepatic:     (+) GERD (using mulitple medications ),     Renal/Genitourinary:  - neg Renal ROS     Endo: Comment: Nontoxic multinodular goiter    Uses prednisone with chemotherapy       Psychiatric/Substance Use:     (+) psychiatric history depression     Infectious Disease:  - neg infectious disease ROS     Malignancy: Comment: SCLC with brain mets s/p surgery and chemotherapy   (+) Malignancy,     Other:            Physical Exam    Airway        Mallampati: I    Neck ROM: full     Respiratory Devices and Support         Dental  no notable dental history         Cardiovascular   cardiovascular exam normal          Pulmonary   pulmonary exam normal                OUTSIDE LABS:  CBC: No results found for: WBC, HGB, HCT, PLT  BMP: No results found for: NA, POTASSIUM, CHLORIDE, CO2, BUN, CR, GLC  COAGS: No results found for: PTT, INR, FIBR  POC: No results found for: BGM, HCG, HCGS  HEPATIC: No results found for: ALBUMIN, PROTTOTAL, ALT, AST, GGT, ALKPHOS, BILITOTAL, BILIDIRECT, JESSI  OTHER: No results found for: PH, LACT, A1C, DELON, PHOS, MAG, LIPASE, AMYLASE, TSH, T4, T3, CRP, SED    Anesthesia Plan    ASA Status:  3      Anesthesia Type: General.     - Airway: ETT   Induction: Intravenous.   Maintenance: Balanced.   Techniques and Equipment:     - Lines/Monitors: 2nd IV, Arterial Line     Consents    Anesthesia Plan(s) and associated risks, benefits, and realistic alternatives discussed. Questions answered and patient/representative(s) expressed understanding.     - Discussed with:  Patient      - Extended Intubation/Ventilatory Support Discussed: No.      - Patient is DNR/DNI Status: No    Use of blood products discussed: Yes.     - Discussed with: Patient.     - Consented: consented to blood products            Reason  for refusal: other.     Postoperative Care    Pain management: IV analgesics.     - Plan for long acting post-op opioid use   PONV prophylaxis: Ondansetron (or other 5HT-3), Dexamethasone or Solumedrol     Comments:              PAC Discussion and Assessment    ASA Classification: 3  Case is suitable for: Manti  Anesthetic techniques and relevant risks discussed: GA                  PAC Resident/NP Anesthesia Assessment: Tamiko Nunez is a 65 year old female scheduled for MRI LASER ABLATION, WITH OPTICAL TRACKING SYSTEM and stereotactic needle biopsy on 8/11/21 by Dr. Crouch in treatment of Brain metastasis.  PAC referral for risk assessment and optimization for anesthesia with comorbid conditions of dyslipidemia, SCLC, nontoxic multinodular goiter, GERD, cervical spinal stenosis, PONV:Pre-operative considerations:1.  Cardiac:  Functional status- METS 1. Denies chest pain. Reports she has been feeling more fatigued and winded since starting treatment for her cancer.  high risk surgery with 0.4% (RCRI #) risk of major adverse cardiac event.~dyslipidemia using zocor~previous cardiac testing above2.  Pulm:  FARIDA risk: intermediate~non smoker3.  GI:  H/O PONV, anti-emetic intervention recommended.4.  ONC: SCLC with brain mets. She is s/p L cellebellar excision 9/2020, SRS 10/28-11/2/2020, 1/29/21. She also underwent carboplatin 10/14/2020-12/21/21. Lurbinectedin 3/22/21-7/26/21.   She follows with oncology at [facility]. She was referred to Dr. Crouch for her brain mets and is now scheduled for the above procedure. 5. endo: h/o nontoxic multinodular goiter6. msk: spinal stenosis, h/o cervical and lumbar proceudreVTE risk: 3%Patient is optimized and is acceptable candidate for the proposed procedure.  No further diagnostic evaluation is needed.**Physical exam and vital signs not completed today as this visit was scheduled as a virtual visit during Covid 19 pandemic. Physical exam should be completed the DOS in  pre-op**Patient discussed with Dr. SmithFinal plan per anesthesiologist on day of surgery. Arrival time, NPO, shower and medication instructions provided by nursing staff today.**For further details of assessment, testing, and physical exam please see H and P completed on same date.Jennifer Lopez PA-C              Reviewed and Signed by PAC Anesthesiologist  Anesthesiologist: Luis  Date: 8/4/21                     Jennifer Lopez PA-C

## 2021-08-06 NOTE — TELEPHONE ENCOUNTER
Surgery packet mailed. Patient has been encouraged to call RN Care Coordinator to review information.    Keke Elliott RN, BSN  Care Coordinator  [facility]

## 2021-08-11 NOTE — ANESTHESIA PROCEDURE NOTES
Arterial Line Procedure Note  Pre-Procedure   Staff -        Anesthesiologist:  Robert Bullock MD       Performed By: anesthesiologist       Location: OR       Procedure Start/Stop Times: 8/11/2021 3:30 PM and 8/11/2021 3:32 PM       Pre-Anesthestic Checklist: patient identified, IV checked, risks and benefits discussed, informed consent, monitors and equipment checked, pre-op evaluation and at physician/surgeon's request  Timeout:       Correct Patient: Yes        Correct Procedure: Yes        Correct Site: Yes        Correct Position: Yes   Procedure   Procedure: arterial line       Laterality: left       Insertion Site: radial.  Sterile Prep        Standard elements of sterile barrier followed       Skin prep: Chloraprep  Insertion/Injection        Technique: ultrasound guided and Higinio's test completed        1. Ultrasound was used to evaluate the access site.       2. Artery evaluated via ultrasound for patency/adequacy.       3. Using real-time ultrasound the needle/catheter was observed entering the artery/vein.       5. The visualized structures were anatomically normal.       6. There were no apparent abnormal pathologic findings.       Catheter Type/Size: 20 G, 1.75 in/4.5 cm quick cath (integral wire)  Narrative        Tegaderm dressing used.       Complications: None apparent,        Arterial waveform: Yes        IBP within 10% of NIBP: Yes

## 2021-08-11 NOTE — PROGRESS NOTES
"  1038: \"Neurosurgery team paged RE: Tamiko Nunez (PreOp 3C) Need clarification regarding MRI. Pt's understanding was to have MRI before surgery. Please call Annabella CLARK [phone]\"  Awaiting return call.    1040 Spoke with Dr Workman. Clarified that no preop op MRI is needed. Informed patient. She does have additional questions for Neurosurg team. Will update them.    1140: lidocaine cream requested by patient prior to port being accesed    1159: \"Neurosurgery team paged RE: Tamiko Nunez (PREOP oP) Pt does still have questions about surgery. Surgical consent still needs signature.Thank you, Annabella CLARK [phone]\"    "

## 2021-08-11 NOTE — PROVIDER NOTIFICATION
1310: Patient still awaiting to talk to Dr. Crouch, Dr. Crouch paged.     1320: After page sent, realized Dr. Crouch still doing procedure prior to patient's in OR 25.  Aware of delay start for her procedure.

## 2021-08-11 NOTE — BRIEF OP NOTE
[facility]    Brief Operative Note    Pre-operative diagnosis: Brain metastasis (H) [C79.31]  Post-operative diagnosis Same as pre-operative diagnosis    Procedure: Procedure(s):  Intraoperative Magnetic Resonance Imaging/Stealth Assisted   (Visualase) LASER ABLATION and stereotactic needle biopsy  **Latex Allergy**  Surgeon: Surgeon(s) and Role:     * Aleks Crouch MD - Primary     * Tico Kim MD - Resident - Assisting  Anesthesia: General   Estimated blood loss: Minimal  Drains: None  Specimens:   ID Type Source Tests Collected by Time Destination   1 : Right tumor 1 Tissue Other SURGICAL PATHOLOGY EXAM Aleks Crouch MD 8/11/2021  5:28 PM    2 : right tumor 2 Tissue Other SURGICAL PATHOLOGY EXAM Aleks Crouch MD 8/11/2021  5:29 PM      Findings:   Frozen specimen consistent with post-treatment effects. .  Complications: None.  Implants: * No implants in log *       Plan:   - Transfer to , CT head prior to transfer  - CT head in AM  - Q1 hr neuro checks overnight  - Decadron - 8 day taper  - Keppra x8 days

## 2021-08-12 NOTE — SIGNIFICANT EVENT
Discharged to: home at 11:50 AM  Belongings: Sent home with patient - suitcase, glasses, walker, cell phone and .   AVS (After Visit Summary) discussed with: patient. Discussed thoroughly. Per Neuro-Surg team, she is to follow Decadron as ordered and discus with oncologist at planned chemotherapy on 8/16/21.

## 2021-08-12 NOTE — PLAN OF CARE
Alert and oriented x4. Follows commands. PERRLA. Provider notified of blurry vision, CT scan completed. NSR. BP stable. 1-2L NC. No BM. Urbano removed. No skin issues, just incision.   Problem: Adult Inpatient Plan of Care  Goal: Plan of Care Review  Outcome: Improving

## 2021-08-12 NOTE — PROGRESS NOTES
08/12/21 1000   Quick Adds   Type of Visit Initial Occupational Therapy Evaluation   Living Environment   People in home alone   Current Living Arrangements other (see comments)  ([facility])   Home Accessibility no concerns   Transportation Anticipated car, drives self   Living Environment Comments Pt lives alone however sister is planning to stay with her a few days following discharge. Home is handicap accessible. Pt's son is nearby and able to assist PRN.    Self-Care   Usual Activity Tolerance moderate   Current Activity Tolerance moderate   Regular Exercise No   Equipment Currently Used at Home walker, rolling   Activity/Exercise/Self-Care Comment Pt IND with ADLs. Pt with balance deficits following radiation treatment, pt using AD for mobility out of the house at baseline.    Instrumental Activities of Daily Living (IADL)   IADL Comments Pt is IND with IADLs. Family is able to assist following discharge.    Disability/Function   Wear Glasses or Blind yes   Vision Management pt with blurred vision while watching tv prior to surgery   Concentrating, Remembering or Making Decisions Difficulty no   Difficulty Communicating no   Difficulty Eating/Swallowing no   Walking or Climbing Stairs Difficulty yes   Walking or Climbing Stairs ambulation difficulty, requires equipment   Dressing/Bathing Difficulty no   Toileting issues no   Doing Errands Independently Difficulty (such as shopping) yes   Errands Management delivery from family/friends    Fall history within last six months no   Change in Functional Status Since Onset of Current Illness/Injury no   General Information   Onset of Illness/Injury or Date of Surgery 08/11/21   Referring Physician Swanpa Workman   Patient/Family Therapy Goal Statement (OT) return home    Additional Occupational Profile Info/Pertinent History of Current Problem Tamiko Nunez is a 65 year old female with a history of stage IV small cell lung cancer w/ single R occipital brain  metastasis s/p resection in 9/2020, SRS, with progressive enlargement of lesion, now s/p lesion biopsy and laser ablation on 8/11.    Cognitive Status Examination   Orientation Status orientation to person, place and time   Affect/Mental Status (Cognitive) WNL   Follows Commands WNL   Visual Perception   Visual Impairment/Limitations blurry vision;corrective lenses for distance   Impact of Vision Impairment on Function (Vision) Pt with blurred vision, difficult to assess improvement since surgery 2/2 to post op swelling    Sensory   Sensory Quick Adds No deficits were identified   Pain Assessment   Patient Currently in Pain No   Integumentary/Edema   Integumentary/Edema no deficits were identifed   Posture   Posture not impaired   Range of Motion Comprehensive   General Range of Motion no range of motion deficits identified   Strength Comprehensive (MMT)   General Manual Muscle Testing (MMT) Assessment no strength deficits identified   Bed Mobility   Comment (Bed Mobility) IND    Transfers   Transfer Comments SBA    Balance   Balance Comments SBA + FWW for mobility    Activities of Daily Living   BADL Assessment lower body dressing   Lower Body Dressing Assessment   Mills Level (Lower Body Dressing) modified independence   Clinical Impression   Criteria for Skilled Therapeutic Interventions Met (OT) yes   OT Diagnosis decreased ADL I    OT Problem List-Impairments impacting ADL activity tolerance impaired;balance;post-surgical precautions   Assessment of Occupational Performance 1-3 Performance Deficits   Identified Performance Deficits bathing, home management, community mobility    Planned Therapy Interventions (OT) ADL retraining;IADL retraining;strengthening;home program guidelines;progressive activity/exercise   Clinical Decision Making Complexity (OT) low complexity   Therapy Frequency (OT) Daily   Predicted Duration of Therapy 2-3 more sessions    Risk & Benefits of therapy have been explained  evaluation/treatment results reviewed   Comment-Clinical Impression Pt near baseline with ADLs and mobility, pt has sister staying with her for a few days and son is able to assist PRN following discharge. Recommend home with assist.    OT Discharge Planning    OT Discharge Recommendation (DC Rec) Home with assist   OT Rationale for DC Rec Pt near baseline with ADLs and mobility, pt has sister staying with her for a few days and son is able to assist PRN following discharge. Recommend home with assist.    OT Brief overview of current status  SBA + FWW    Total Evaluation Time (Minutes)   Total Evaluation Time (Minutes) 5

## 2021-08-12 NOTE — PROGRESS NOTES
[facility], [place]   08/12/2021  Neurosurgery Progress Note:    Assessment:  Tamiko Nunez is a 65 year old female with a history of stage IV small cell lung cancer w/ single R occipital brain metastasis s/p resection in 9/2020, SRS, with progressive enlargement of lesion, now s/p lesion biopsy and laser ablation on 8/11.     In addition to the assessment of diagnoses detailed above, this 65 year old female  patient admitted electively has the following conditions contributing to the complexity of their medical care:    Clinically pertinent cerebral edema which was evident on the CT/MRI and was treated with decadron (corticosteroids) and Brain cancer,  Metastatic cancer, small cell lung cancer  Dislipidemia  Nontoxic multinodular goiter  GERD  Cervical spinal stenosis    Plan:    Neuro:  #Metastatic brain cancer  #Cerebral edema  #s/p R tumor biopsy and laser ablation  #Depression  - Serial neuro exam  - tylenlol, oxycodone prn  - Keppra 8 days  - Decadron 8 day taper  - Head CT in am  - PTA Fluoxetine, trazodone, topiramate    CV:  #HLD  - SBP<140  - labetalol/hydralazine prn  - PTA simvastatin    Pulm:  - Supplemental O2 as needed  - Incentive spirometry    GI:  #GERD  - prn antiemetics  - bowel regimen: miralax, senna  - PTA omeprazole    Renal/FEN:  - Advance diet as tolerated  - Electrolyte replacement protocol  - strict I/O  - IVF until adequate PO intake    :  - [place] - remove on POD1    MSK:  - HOB > 30    HEME:  #Acute blood loss anemia  - follow-up Hgb  - Hgb > 8  - plts > 100k  - INR < 1.5    ENDO:  #Toxic multinodular goiter  - POCT glucose checks    ID:  - monitor for fever  - Ancef 24 hours postop    PPX:  - SCDs for DVT proph    Dispo:  - PT/OT        -----------------------------------  Ashley Bajwa MD  Neurosurgery PGY2     Please contact neurosurgery resident on call with questions.    Dial * * *[phone], enter [phone] when  prompted.  -----------------------------------    Interval History: NAEO, reports slightly worsened blurry vision    Objective:   Temp:  [98  F (36.7  C)-98.3  F (36.8  C)] 98.3  F (36.8  C)  Pulse:  [] 92  Resp:  [11-20] 14  BP: (110-133)/(62-93) 119/62  MAP:  [73 mmHg-96 mmHg] 82 mmHg  Arterial Line BP: (106-144)/(52-71) 115/64  SpO2:  [93 %-99 %] 95 %  I/O last 3 completed shifts:  In: 1300 [I.V.:1300]  Out: 305 [Urine:300; Blood:5]    Gen: Appears comfortable, NAD  Wound: clean, dry, intact  Neurologic:  - Alert & Oriented to person, place, time, and situation  - Follows commands briskly  - Speech fluent, spontaneous. No aphasia or dysarthria.  - No gaze preference. No apparent hemineglect.  - PERRL, EOMI  - Face symmetric with sensation intact to light touch  - Palate elevates symmetrically, uvula midline, tongue protrudes midline  - Trapezii muscles 5/5 bilaterally  - No pronator drift  - No clonus     Del Tr Bi WE WF Gr   R 5 5 5 5 5 5   L 5 5 5 5 5 5    HF KE KF DF PF EHL   R 5 5 5 5 5 5   L 5 5 5 5 5 5     Reflexes 2+ throughout    Sensation intact and symmetric to light touch throughout    LABS:  Recent Labs   Lab 08/11/21  1948 08/11/21  1040 08/11/21  1017 08/09/21  1315   NA  --  139  --  140   POTASSIUM 3.7 3.4  --  3.8   CHLORIDE  --  111*  --  110*   CO2  --  20  --  25   ANIONGAP  --  8  --  5   GLC  --  115* 125* 102*   BUN  --  11  --  9   CR  --  0.73  --  0.79   DELON  --  8.9  --  9.0       Recent Labs   Lab 08/11/21 2000   WBC 2.5*   RBC 3.14*   HGB 10.7*   HCT 32.0*   *   MCH 34.1*   MCHC 33.4   RDW 13.7          IMAGING:  No results found for this or any previous visit (from the past 24 hour(s)).

## 2021-08-12 NOTE — PLAN OF CARE
PT 4A: Will sign off. Per conversation with OT, chart review, and observation of mobility, no acute PT needs identified. Pt to discharge home and will complete PT orders.

## 2021-08-12 NOTE — PROGRESS NOTES
Admitted/transferred from: PACU  Reason for admission/transfer: Frequent neuros.  2 RN skin assessment: completed by BINU Haile RN and Viv   Result of skin assessment and interventions/actions: Incisions to head.   Height, weight, drug calc weight: Done  Patient belongings (see Flowsheet)  MDRO education added to care planN/A  ?

## 2021-08-12 NOTE — PROGRESS NOTES
SPIRITUAL HEALTH SERVICES Progress Note  I met with pt this morning. She shared that she is doing well at this time and is looking forward to being able to go home and does not need any spiritual care support today.       Ra Ramos  Associate

## 2021-08-12 NOTE — ANESTHESIA POSTPROCEDURE EVALUATION
Patient: Tamiko Nunez    Procedure(s):  Intraoperative Magnetic Resonance Imaging Assisted   (Visualase) LASER ABLATION and stereotactic needle biopsy  **Latex Allergy**    Diagnosis:Brain metastasis (H) [C79.31]  Diagnosis Additional Information: No value filed.    Anesthesia Type:  General    Note:  Disposition: Admission   Postop Pain Control: Uneventful            Sign Out: Well controlled pain   PONV: No   Neuro/Psych: Uneventful            Sign Out: Acceptable/Baseline neuro status   Airway/Respiratory: Uneventful            Sign Out: Acceptable/Baseline resp. status   CV/Hemodynamics: Uneventful            Sign Out: Acceptable CV status   Other NRE: NONE   DID A NON-ROUTINE EVENT OCCUR? No           Last vitals:  Vitals Value Taken Time   /72 08/11/21 2030   Temp 36.7  C (98  F) 08/11/21 1945   Pulse 89 08/11/21 2040   Resp 20 08/11/21 2030   SpO2 94 % 08/11/21 2040   Vitals shown include unvalidated device data.    Electronically Signed By: Niru Mcfarland MD  August 11, 2021  8:42 PM

## 2021-08-12 NOTE — DISCHARGE SUMMARY
[facility] Discharge Summary and Instructions    Tamiko Nunez MRN# [id number]   Age: 65 year old YOB: 1955     Date of Admission:  8/11/2021  Date of Discharge::  8/12/2021 11:58 AM  Admitting Physician:  Aleks Crouch MD  Discharge Physician:  Aleks Crouch MD          Admission Diagnoses:   Brain metastasis (H) [C79.31]          Discharge Diagnosis:     Brain metastasis (H) [C79.31]     In addition to the assessment of diagnoses detailed above, this 65 year old female  patient admitted from home who has the following conditions contributing to the complexity of their medical care:    Brain cancer and Brain compression which was evident on the CT/MRI.,  Clinically pertinent cerebral edema which was evident on the CT/MRI and was treated with decadron (corticosteroids) and Brain cancer,  Metastatic cancer, small cell lung cancer  Dislipidemia  Nontoxic multinodular goiter  GERD  Cervical spinal stenosis         Procedures:   8/11/2021  1) MRI guided biopsy, right  2) MRI guided laser ablation, right              Brief History of Illness:   65 F with a progressively enlarging right occipital brain metastasis after radiosurgery treatment. The patient presents for a stereotactic needle biopsy and laser ablation of this lesion.           Hospital Course:   Patient underwent above-mentioned procedure on 8/11/2021. The operation was uncomplicated and she was admitted to the surgical ICU for routine post operative cares.  Postoperatively the patient endorsed very little pain but did note mild blurry vision.  Post operative head CT obtained and did note a small amount of hemorrhage at the ablation site.  Repeat head CT was obtained the following morning and was stable.   Post operatively a two week decadron taper was initiated.    Patient was evaluated by PT who felt she was safe to discharge home with family.    Prior to discharge the patient was medically and neurologically  stable, tolerating diet, voiding, ambulating, and pain was adequately controlled with oral analgesia.    Patient was able to discharge home on POD#1.   Patient will follow-up with Dr. Aleks Crouch in 2 weeks in Neurosurgery clinic.     Surgical pathology was pending at time of discharge.        Discharge Medications:     Current Discharge Medication List      START taking these medications    Details   clindamycin (CLEOCIN) 300 MG capsule Take 1 capsule (300 mg) by mouth 3 times daily for 7 days  Qty: 21 capsule, Refills: 0    Associated Diagnoses: Brain metastasis (H)      levETIRAcetam (KEPPRA) 500 MG tablet Take 1 tablet (500 mg) by mouth 2 times daily for 7 days  Qty: 14 tablet, Refills: 0    Associated Diagnoses: Brain metastasis (H)      oxyCODONE (ROXICODONE) 5 MG tablet Take 1 tablet (5 mg) by mouth every 4 hours as needed for pain  Qty: 20 tablet, Refills: 0    Associated Diagnoses: Brain metastasis (H)      senna-docusate (SENOKOT-S/PERICOLACE) 8.6-50 MG tablet Take 1 tablet by mouth 2 times daily for 14 days  Qty: 28 tablet, Refills: 0    Associated Diagnoses: Brain metastasis (H)         CONTINUE these medications which have CHANGED    Details   !! dexamethasone (DECADRON) 1 MG tablet Take 1 tablet (1 mg) by mouth every 8 hours for 3 days  Qty: 9 tablet, Refills: 0    Comments: Taper 4  Associated Diagnoses: Brain metastasis (H)      !! dexamethasone (DECADRON) 1.5 MG tablet Take 2 tablets (3 mg) by mouth every 8 hours for 4 days  Qty: 24 tablet, Refills: 0    Comments: Taper 2  Associated Diagnoses: Brain metastasis (H)      !! dexamethasone (DECADRON) 2 MG tablet Take 1 tablet (2 mg) by mouth every 8 hours for 3 days  Qty: 9 tablet, Refills: 0    Comments: Taper 3  Associated Diagnoses: Brain metastasis (H)      !! dexamethasone (DECADRON) 4 MG tablet Take 1 tablet (4 mg) by mouth every 8 hours for 4 days  Qty: 12 tablet, Refills: 0    Comments: Taper 1  Associated Diagnoses: Brain metastasis (H)        !! - Potential duplicate medications found. Please discuss with provider.      CONTINUE these medications which have NOT CHANGED    Details   Acetaminophen (TYLENOL) 325 MG CAPS Take 325-650 mg by mouth every 4 hours as needed      benzonatate (TESSALON) 200 MG capsule 3 times daily       Biotin 10 MG TABS tablet every morning       calcium carbonate (TUMS) 500 MG chewable tablet Take 1 chew tab by mouth 2 times daily      cholecalciferol 50 MCG (2000 UT) tablet Take 2,000 Units by mouth every morning       FLUoxetine (PROZAC) 20 MG capsule Take 20 mg by mouth every evening       fluticasone (FLONASE) 50 MCG/ACT nasal spray 1 spray daily as needed       folic acid 800 MCG tablet Take 800 mcg by mouth every morning       melatonin 3 MG tablet Take 1 mg by mouth nightly as needed for sleep      omeprazole (PRILOSEC) 20 MG DR capsule Take 20 mg by mouth 2 times daily       sennosides (SENOKOT) 8.6 MG tablet Take 1 tablet by mouth daily      simvastatin (ZOCOR) 20 MG tablet Take 20 mg by mouth At Bedtime       topiramate (TOPAMAX) 50 MG tablet 2 times daily       traZODone (DESYREL) 50 MG tablet Take 125 mg by mouth At Bedtime       alum & mag hydroxide-simethicone (MAALOX) 200-200-20 MG/5ML SUSP suspension Take 30 mLs by mouth once as needed       lidocaine (XYLOCAINE) 2 % solution       nystatin (MYCOSTATIN) 284474 UNIT/GM external powder Apply topically daily as needed       prochlorperazine (COMPAZINE) 10 MG tablet Take 10 mg by mouth every 6 hours as needed             Objective:   Temp:  [98  F (36.7  C)-98.3  F (36.8  C)] 98.3  F (36.8  C)  Pulse:  [] 92  Resp:  [11-20] 14  BP: (110-133)/(62-93) 119/62  MAP:  [73 mmHg-96 mmHg] 82 mmHg  Arterial Line BP: (106-144)/(52-71) 115/64  SpO2:  [93 %-99 %] 95 %  I/O last 3 completed shifts:  In: 1300 [I.V.:1300]  Out: 305 [Urine:300; Blood:5]     Gen: Appears comfortable, NAD  Wound: clean, dry, intact  Neurologic:  - Alert & Oriented to person, place, time, and  situation  - Follows commands briskly  - Speech fluent, spontaneous. No aphasia or dysarthria.  - No gaze preference. No apparent hemineglect.  - PERRL, EOMI  - Face symmetric with sensation intact to light touch  - Palate elevates symmetrically, uvula midline, tongue protrudes midline  - Trapezii muscles 5/5 bilaterally  - No pronator drift  - No clonus       Del Tr Bi WE WF Gr   R 5 5 5 5 5 5   L 5 5 5 5 5 5     HF KE KF DF PF EHL   R 5 5 5 5 5 5   L 5 5 5 5 5 5      Reflexes 2+ throughout     Sensation intact and symmetric to light touch throughout           Discharge Instructions and Follow-Up:     Discharge diet: Regular   Discharge activity: You may advance activity as tolerated. No strenuous exercise or heay lifting greater than 10 lbs for 4 weeks or until seen and cleared in clinic.   Discharge follow-up:   Follow-up Dr. Aleks Crouch MD on 8/27/2021 @ 1:15 pm, all additional follow-up visits to be determined by Dr. Aleks Crouch MD       Wound care: Ok to shower,however no scrubbing of the wound and no soaking of the wound, meaning no bathtubs or swimming pools. Pat dry only. Leave wound open to air.  Patient to have wound checked 2 weeks following surgery.    Wound location: right cranial  Closure technique: monocryl  Dressing needs: none  Post-op care at follow-up: Keep dry and clean     Please call if you have:  1. increased pain, redness, drainage, swelling at your incision  2. fevers > 101.5 F degrees  3. with any questions or concerns.  You may reach the Neurosurgery clinic at [phone] during regular work hours. ER at [phone].    and ask for the Neurosurgery Resident on call at [phone], during off hours or weekends.         Discharge Disposition:     Discharged to home        KEN Sharif, CNP  Department of Neurosurgery  Pager: [phone]

## 2021-08-12 NOTE — OP NOTE
Name: Tamiko Nunez  MRN: [id number]  YOB: 1955  Date of Surgery: August 11, 2021     Pre-operative Diagnosis:  radiation treated right occipital brain metastasis  Post-operative Diagnosis:   Same  Procedure:    1) MRI guided biopsy, right  1) MRI guided laser ablation, right     Anesthesia:  GETA     Surgeon: Aleks Castillo MD, PhD  Assistant:  Tico Kim MD, PhD     Indication:  65 F with a progressively enlarging right occipital brain metastasis after radiosurgery treatment. The patient presents for a stereotactic needle biopsy and laser ablation of this lesion.     Description of Procedure: After pre-operative laboratory value and informed consent were verified, the patient was brought into the operating room. The patient underwent general anesthesia and intubation. Antibiotic was administered.     The patient was placed in a supine position, with head turned to the left and pinned in a Kirkpatrick device. The right parietal area was prepped in a sterile manner. A Smart Grid was applied over the approximate entry point of the planned trajectory. The MRI was brought into the room for imaging of the lesion.      Based on the lesion, the planned entry point was identified.  The Clearpoint frame was mounted.  Trajectory was adjusted based on real time MR imaging until the desired trajectory was achieved.       A 3 mm incision was made over the planned entry point for the biopsy. A 3 mm Litchfield hole was made to support the planned trajectory. An MRI compatible biopsy needle was inserted to the prescribed depth. Two biopsies were taken.    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    A separate 3 mm incision was made over the planned entry point for the laser ablation. A 3 mm [place]itchfield hole was made to support the planned trajectory. A Visualase laser probe was inserted to the prescribed depth. The entirety of the lesion was ablated in a single treatment. The laser probe and  the Clearpoint frame was then removed.      After hemostasis, I turned my attention to the closure.  The wound was irrigated and closed with a 3'0 Monocryl. Exofin was applied.     I was present and performed the key portions of the procedure.     EBL:   < 10 cc's    Specimens:   two biopsy specimens     Disposition: ICU

## 2021-08-12 NOTE — ANESTHESIA CARE TRANSFER NOTE
Patient: Tamiko Nunez    Procedure(s):  Intraoperative Magnetic Resonance Imaging Assisted   (Visualase) LASER ABLATION and stereotactic needle biopsy  **Latex Allergy**    Diagnosis: Brain metastasis (H) [C79.31]  Diagnosis Additional Information: No value filed.    Anesthesia Type:   General     Note:    Oropharynx: oropharynx clear of all foreign objects and spontaneously breathing  Level of Consciousness: drowsy and iatrogenic sedation  Oxygen Supplementation: face mask    Independent Airway: airway patency satisfactory and stable  Dentition: dentition unchanged  Vital Signs Stable: post-procedure vital signs reviewed and stable  Report to RN Given: handoff report given  Patient transferred to: PACU  Comments: Sleepy but follows commands to verbal ROSE. VSS. Tolerated anesthesia well  Handoff Report: Identifed the Patient, Identified the Reponsible Provider, Reviewed the pertinent medical history, Discussed the surgical course, Reviewed Intra-OP anesthesia mangement and issues during anesthesia, Set expectations for post-procedure period and Allowed opportunity for questions and acknowledgement of understanding      Vitals:  Vitals Value Taken Time   BP     Temp     Pulse     Resp     SpO2         Electronically Signed By: KEN Man CRNA  August 11, 2021  7:26 PM

## 2021-08-13 NOTE — PLAN OF CARE
Occupational Therapy Discharge Summary    Reason for therapy discharge:    Discharged to home.    Progress towards therapy goal(s). See goals on Care Plan in [facility] electronic health record for goal details.  Goals partially met.  Barriers to achieving goals:   discharge from facility.    Therapy recommendation(s):    No further therapy is recommended.

## 2021-08-26 NOTE — LETTER
8/26/2021         RE: Tamiko Nunez  [address] [address] [address]        Dear Colleague,    Thank you for referring your patient, Tamiko Nunez, to the [facility]. Please see a copy of my visit note below.    Per pt last day of Decadron    Post-operative visit    65  F with a progressively enlarging right occipital brain metastasis after radiosurgery treatment. The patient underwent a stereotactic needle biopsy and laser ablation of this lesion on 8/11/2021. The patient was discharged home on POD1. The final biopsy results revealed radiation necrosis. The patient returns today for a post-operative follow-up.    No new complaints on review of systems. The patient states that her recovery from this surgery is significantly better than her last craniotomy.    Examination non-focal. Wound well healing.    AP: 65 F doing well after stereotactic biopsy and laser ablation. The ablation should adequately treat the radiation necrosis. No further surgical treatment is needed, though the patient will need to continue her surveillance MRIs every 2-3 months. I will continue to follow this patient through Dr. Rai's office.        Aleks Crouch MD

## 2021-08-26 NOTE — PROGRESS NOTES
Patient Location [place]  How would you like to obtain your AVS? MyChart  If the video visit is dropped, the invitation should be resent by: Text to cell phone: [phone]   Will anyone else be joining your video visit? Yes: Sister Serena. How would they like to receive their invitation? Other e-mail: NA   Per pt last day of Decadron    Post-operative visit    65  F with a progressively enlarging right occipital brain metastasis after radiosurgery treatment. The patient underwent a stereotactic needle biopsy and laser ablation of this lesion on 8/11/2021. The patient was discharged home on POD1. The final biopsy results revealed radiation necrosis. The patient returns today for a post-operative follow-up.    No new complaints on review of systems. The patient states that her recovery from this surgery is significantly better than her last craniotomy.    Examination non-focal. Wound well healing.    AP: 65 F doing well after stereotactic biopsy and laser ablation. The ablation should adequately treat the radiation necrosis. No further surgical treatment is needed, though the patient will need to continue her surveillance MRIs every 2-3 months. I will continue to follow this patient through Dr. Rai's office.

## 2023-04-03 PROBLEM — C79.31 MALIGNANT NEOPLASM METASTATIC TO BRAIN (H): Status: ACTIVE | Noted: 2021-01-01

## (undated) DEVICE — DRSG GAUZE 4X4" TRAY 6939

## (undated) DEVICE — WIPES FOLEY CARE SURESTEP PROVON DFC100

## (undated) DEVICE — DRSG KERLIX 4 1/2"X4YDS ROLL 6715

## (undated) DEVICE — BLADE CLIPPER SGL USE 9680

## (undated) DEVICE — PACK GOWN 3/PK DISP XL SBA32GPFCB

## (undated) DEVICE — Device

## (undated) DEVICE — SUCTION MANIFOLD NEPTUNE 2 SYS 4 PORT 0702-020-000

## (undated) DEVICE — PACK NEURO MINOR UMMC SNE32MNMU4

## (undated) DEVICE — COMB STERILE 7" PLASTIC 14-1200

## (undated) DEVICE — GLOVE PROTEXIS MICRO 7.0  2D73PM70

## (undated) DEVICE — SOL NACL 0.9% IRRIG 1000ML BOTTLE 2F7124

## (undated) DEVICE — SOL NACL 0.9% INJ 1000ML BAG 2B1324X

## (undated) DEVICE — JELLY LUBRICATING SURGILUBE 2OZ TUBE

## (undated) DEVICE — PREP DURAPREP 26ML APL 8630

## (undated) DEVICE — SOL WATER IRRIG 1000ML BOTTLE 2F7114

## (undated) DEVICE — DRAPE IOBAN INCISE 13X13" 6640EZ

## (undated) DEVICE — STPL SKIN 35W ROTATING HEAD PRW35

## (undated) DEVICE — LINEN TOWEL PACK X30 5481

## (undated) DEVICE — DRAPE MAYO STAND 23X54 8337

## (undated) DEVICE — PIN SKULL MAYFIELD ADULT TITANIUM 3/PK A1120

## (undated) DEVICE — MARKER SPHERES PASSIVE MEDT PACK 5 8801075

## (undated) DEVICE — RX BACITRACIN OINTMENT 0.9G 1/32OZ CUR001109

## (undated) DEVICE — ADH SKIN CLOSURE PREMIERPRO EXOFIN 1.0ML 3470

## (undated) DEVICE — GLOVE PROTEXIS BLUE W/NEU-THERA 7.5  2D73EB75

## (undated) RX ORDER — LABETALOL HYDROCHLORIDE 5 MG/ML
INJECTION, SOLUTION INTRAVENOUS
Status: DISPENSED
Start: 2021-01-01

## (undated) RX ORDER — FENTANYL CITRATE 50 UG/ML
INJECTION, SOLUTION INTRAMUSCULAR; INTRAVENOUS
Status: DISPENSED
Start: 2021-01-01

## (undated) RX ORDER — HYDROMORPHONE HYDROCHLORIDE 1 MG/ML
INJECTION, SOLUTION INTRAMUSCULAR; INTRAVENOUS; SUBCUTANEOUS
Status: DISPENSED
Start: 2021-01-01

## (undated) RX ORDER — LIDOCAINE 40 MG/G
CREAM TOPICAL
Status: DISPENSED
Start: 2021-01-01

## (undated) RX ORDER — ACETAMINOPHEN 325 MG/1
TABLET ORAL
Status: DISPENSED
Start: 2021-01-01

## (undated) RX ORDER — SODIUM CHLORIDE 9 MG/ML
INJECTION, SOLUTION INTRAVENOUS
Status: DISPENSED
Start: 2021-01-01

## (undated) RX ORDER — CLINDAMYCIN PHOSPHATE 900 MG/50ML
INJECTION, SOLUTION INTRAVENOUS
Status: DISPENSED
Start: 2021-01-01